# Patient Record
Sex: MALE | Race: WHITE | NOT HISPANIC OR LATINO | Employment: FULL TIME | URBAN - METROPOLITAN AREA
[De-identification: names, ages, dates, MRNs, and addresses within clinical notes are randomized per-mention and may not be internally consistent; named-entity substitution may affect disease eponyms.]

---

## 2023-04-05 ENCOUNTER — TELEPHONE (OUTPATIENT)
Dept: PRIMARY CARE | Facility: CLINIC | Age: 63
End: 2023-04-05

## 2023-04-05 NOTE — TELEPHONE ENCOUNTER
SLEEP STUDY AND DR NOTES NEED TO BE SENT TO [facility] [phone] IS PHONE NUMBER TO [place] OFFICE  [phone] IS THE FAX NUMBER  IN ORDER TO GET CPAP MACHINE

## 2023-07-07 ENCOUNTER — OFFICE VISIT (OUTPATIENT)
Dept: PRIMARY CARE | Facility: CLINIC | Age: 63
End: 2023-07-07
Payer: MEDICAID

## 2023-07-07 VITALS
WEIGHT: 247.2 LBS | HEIGHT: 70 IN | DIASTOLIC BLOOD PRESSURE: 66 MMHG | HEART RATE: 74 BPM | OXYGEN SATURATION: 98 % | SYSTOLIC BLOOD PRESSURE: 132 MMHG | BODY MASS INDEX: 35.39 KG/M2

## 2023-07-07 DIAGNOSIS — D58.2 ELEVATED HEMOGLOBIN (CMS-HCC): ICD-10-CM

## 2023-07-07 DIAGNOSIS — I10 PRIMARY HYPERTENSION: ICD-10-CM

## 2023-07-07 DIAGNOSIS — G47.33 OBSTRUCTIVE SLEEP APNEA, ADULT: Primary | ICD-10-CM

## 2023-07-07 DIAGNOSIS — D64.9 ANEMIA, UNSPECIFIED TYPE: ICD-10-CM

## 2023-07-07 DIAGNOSIS — F17.219 CIGARETTE NICOTINE DEPENDENCE WITH NICOTINE-INDUCED DISORDER: ICD-10-CM

## 2023-07-07 DIAGNOSIS — J41.8 MIXED SIMPLE AND MUCOPURULENT CHRONIC BRONCHITIS (MULTI): ICD-10-CM

## 2023-07-07 DIAGNOSIS — E78.2 MIXED HYPERLIPIDEMIA: ICD-10-CM

## 2023-07-07 DIAGNOSIS — D17.20 LIPOMA OF UPPER EXTREMITY, UNSPECIFIED LATERALITY: ICD-10-CM

## 2023-07-07 DIAGNOSIS — D75.1 POLYCYTHEMIA: ICD-10-CM

## 2023-07-07 PROBLEM — D17.9 LIPOMA: Status: ACTIVE | Noted: 2023-07-07

## 2023-07-07 PROBLEM — R73.9 HYPERGLYCEMIA: Status: ACTIVE | Noted: 2023-07-07

## 2023-07-07 PROBLEM — E78.5 HYPERLIPIDEMIA: Status: ACTIVE | Noted: 2023-07-07

## 2023-07-07 LAB — POC HEMOGLOBIN: 18.6 G/DL (ref 13.5–17.5)

## 2023-07-07 PROCEDURE — 99214 OFFICE O/P EST MOD 30 MIN: CPT | Performed by: FAMILY MEDICINE

## 2023-07-07 PROCEDURE — 85018 HEMOGLOBIN: CPT | Performed by: FAMILY MEDICINE

## 2023-07-07 RX ORDER — ATORVASTATIN CALCIUM 80 MG/1
TABLET, FILM COATED ORAL
COMMUNITY
Start: 2020-09-23 | End: 2023-07-07 | Stop reason: ALTCHOICE

## 2023-07-07 RX ORDER — ROSUVASTATIN CALCIUM 20 MG/1
20 TABLET, COATED ORAL DAILY
COMMUNITY
Start: 2023-04-22 | End: 2024-01-15 | Stop reason: SDUPTHER

## 2023-07-07 RX ORDER — LISINOPRIL 20 MG/1
20 TABLET ORAL DAILY
COMMUNITY
End: 2024-03-19 | Stop reason: DRUGHIGH

## 2023-07-07 RX ORDER — ASPIRIN 81 MG/1
81 TABLET ORAL DAILY
COMMUNITY
Start: 2020-09-23

## 2023-07-07 ASSESSMENT — ENCOUNTER SYMPTOMS
MYALGIAS: 0
DYSPHORIC MOOD: 0
SINUS PAIN: 0
SPEECH DIFFICULTY: 0
SEIZURES: 0
ABDOMINAL PAIN: 0
SLEEP DISTURBANCE: 0
NERVOUS/ANXIOUS: 0
CONSTIPATION: 0
DYSURIA: 0
ACTIVITY CHANGE: 0
HEADACHES: 0
BLOOD IN STOOL: 0
DECREASED CONCENTRATION: 0
DIARRHEA: 0
CONFUSION: 0
RECTAL PAIN: 0
EYE PAIN: 0
POLYDIPSIA: 0
DIZZINESS: 0
ADENOPATHY: 0
POLYPHAGIA: 0
HEMATURIA: 0
AGITATION: 0
STRIDOR: 0
CONSTITUTIONAL NEGATIVE: 1
PALPITATIONS: 0
FATIGUE: 0
NECK STIFFNESS: 0
SORE THROAT: 0
PHOTOPHOBIA: 0
SINUS PRESSURE: 0
ABDOMINAL DISTENTION: 0
COUGH: 0
FLANK PAIN: 0
COLOR CHANGE: 0
ARTHRALGIAS: 0
TROUBLE SWALLOWING: 0
SHORTNESS OF BREATH: 0
APPETITE CHANGE: 0
CHEST TIGHTNESS: 0
RHINORRHEA: 0
FEVER: 0

## 2023-07-07 NOTE — PATIENT INSTRUCTIONS
Follow up in 4 MONTHS    Continue current medications and therapy for chronic medical conditions.    Patient was advised importance of proper diet/nutrition in addition adequate hydration. Patient was encouraged moderate exercise program to include 30 minutes daily for 5 days of the week or 150 minutes weekly. Patient will follow-up with us as scheduled.    CPAP ordered     CBC, CMP and Lipid ordered     Dermatology referral ordered     IO Hemoglobin 18.6    CT Low dose ordered for December 2023    REFER TO HEMATOLOGY

## 2023-07-07 NOTE — PROGRESS NOTES
"Subjective   Patient ID: Rito Palomo is a 62 y.o. male who presents for Follow-up (Hyperlipidemia and hypertension. ).    Patient is here for a follow up on his hypertension, states he does not check his blood pressure at home but denies headaches,s blurred vision, heart palpitations.     Patient would like to discuss sleep study.          Review of Systems   Constitutional: Negative.  Negative for activity change, appetite change, fatigue and fever.   HENT:  Negative for congestion, dental problem, ear discharge, ear pain, mouth sores, rhinorrhea, sinus pressure, sinus pain, sore throat, tinnitus and trouble swallowing.    Eyes:  Negative for photophobia, pain and visual disturbance.   Respiratory:  Negative for cough, chest tightness, shortness of breath and stridor.    Cardiovascular:  Negative for chest pain and palpitations.   Gastrointestinal:  Negative for abdominal distention, abdominal pain, blood in stool, constipation, diarrhea and rectal pain.   Endocrine: Negative for cold intolerance, heat intolerance, polydipsia, polyphagia and polyuria.   Genitourinary:  Negative for dysuria, flank pain, hematuria and urgency.   Musculoskeletal:  Negative for arthralgias, gait problem, myalgias and neck stiffness.   Skin:  Negative for color change and rash.   Allergic/Immunologic: Negative for environmental allergies and food allergies.   Neurological:  Negative for dizziness, seizures, syncope, speech difficulty and headaches.   Hematological:  Negative for adenopathy.   Psychiatric/Behavioral:  Negative for agitation, confusion, decreased concentration, dysphoric mood and sleep disturbance. The patient is not nervous/anxious.        Objective   /66   Pulse 74   Ht 1.778 m (5' 10\")   Wt 112 kg (247 lb 3.2 oz)   SpO2 98%   BMI 35.47 kg/m²     Physical Exam  Vitals reviewed.   Constitutional:       General: He is not in acute distress.     Appearance: He is obese. He is not ill-appearing or " diaphoretic.   HENT:      Head: Normocephalic.      Right Ear: Tympanic membrane and external ear normal.      Left Ear: Tympanic membrane and external ear normal.      Nose: Nose normal. No congestion.      Mouth/Throat:      Mouth: Mucous membranes are dry.      Pharynx: No posterior oropharyngeal erythema.   Eyes:      General:         Right eye: No discharge.         Left eye: No discharge.      Extraocular Movements: Extraocular movements intact.      Conjunctiva/sclera: Conjunctivae normal.      Pupils: Pupils are equal, round, and reactive to light.   Cardiovascular:      Rate and Rhythm: Normal rate and regular rhythm.      Pulses: Normal pulses.      Heart sounds: Normal heart sounds. No murmur heard.  Pulmonary:      Effort: Pulmonary effort is normal. No respiratory distress.      Breath sounds: Normal breath sounds. No wheezing or rales.   Chest:      Chest wall: No tenderness.   Abdominal:      General: Abdomen is flat. Bowel sounds are normal. There is distension.      Palpations: There is no mass.      Tenderness: There is no abdominal tenderness. There is no guarding.   Genitourinary:     Rectum: Normal.   Musculoskeletal:         General: No tenderness. Normal range of motion.      Cervical back: Normal range of motion and neck supple. No tenderness.      Right lower leg: No edema.      Left lower leg: No edema.   Skin:     General: Skin is warm and dry.      Coloration: Skin is not jaundiced.      Findings: No bruising or erythema.   Neurological:      General: No focal deficit present.      Mental Status: He is alert and oriented to person, place, and time. Mental status is at baseline.      Cranial Nerves: No cranial nerve deficit.      Sensory: No sensory deficit.      Coordination: Coordination normal.      Gait: Gait normal.   Psychiatric:         Mood and Affect: Mood normal.         Thought Content: Thought content normal.         Judgment: Judgment normal.         Assessment/Plan   Problem  List Items Addressed This Visit       Hyperlipidemia    Relevant Orders    Lipid Panel    Hypertension    Relevant Orders    Comprehensive Metabolic Panel    CBC and Auto Differential    Lipoma    Relevant Orders    Referral to Dermatology    Obstructive sleep apnea, adult - Primary    Relevant Orders    Positive Airway Pressure (PAP) Therapy     Other Visit Diagnoses       Anemia, unspecified type        Relevant Orders    POCT Hemoglobin manually resulted (Completed)    Polycythemia        Relevant Orders    Referral to Hematology    Mixed simple and mucopurulent chronic bronchitis (CMS/HCC)               Scribe Attestation  By signing my name below, ILulu , Scribe   attest that this documentation has been prepared under the direction and in the presence of Topher Lou DO.  Provider Attestation - Scribe documentation  All medical record entries made by the Scribe were at my direction and personally dictated by me. I have reviewed the chart and agree that the record accurately reflects my personal performance of the history, physical exam, discussion and plan.

## 2023-11-07 ENCOUNTER — OFFICE VISIT (OUTPATIENT)
Dept: PRIMARY CARE | Facility: CLINIC | Age: 63
End: 2023-11-07
Payer: MEDICAID

## 2023-11-07 VITALS
HEIGHT: 70 IN | SYSTOLIC BLOOD PRESSURE: 128 MMHG | TEMPERATURE: 98.2 F | BODY MASS INDEX: 34.67 KG/M2 | DIASTOLIC BLOOD PRESSURE: 60 MMHG | OXYGEN SATURATION: 94 % | WEIGHT: 242.2 LBS | HEART RATE: 50 BPM | RESPIRATION RATE: 15 BRPM

## 2023-11-07 DIAGNOSIS — I10 PRIMARY HYPERTENSION: Primary | ICD-10-CM

## 2023-11-07 DIAGNOSIS — J41.8 MIXED SIMPLE AND MUCOPURULENT CHRONIC BRONCHITIS (MULTI): ICD-10-CM

## 2023-11-07 DIAGNOSIS — F17.200 NICOTINE DEPENDENCE WITH CURRENT USE: ICD-10-CM

## 2023-11-07 DIAGNOSIS — G47.33 OBSTRUCTIVE SLEEP APNEA, ADULT: ICD-10-CM

## 2023-11-07 DIAGNOSIS — E78.2 MIXED HYPERLIPIDEMIA: ICD-10-CM

## 2023-11-07 DIAGNOSIS — R00.1 BRADYCARDIA, SINUS: ICD-10-CM

## 2023-11-07 PROCEDURE — 99214 OFFICE O/P EST MOD 30 MIN: CPT | Performed by: FAMILY MEDICINE

## 2023-11-07 ASSESSMENT — ENCOUNTER SYMPTOMS
ARTHRALGIAS: 0
BLOOD IN STOOL: 0
POLYDIPSIA: 0
ACTIVITY CHANGE: 0
HYPERTENSION: 1
FATIGUE: 0
SEIZURES: 0
CONSTIPATION: 0
ABDOMINAL DISTENTION: 0
PHOTOPHOBIA: 0
COLOR CHANGE: 0
HEMATURIA: 0
SPEECH DIFFICULTY: 0
APPETITE CHANGE: 0
PALPITATIONS: 0
STRIDOR: 0
SHORTNESS OF BREATH: 0
ABDOMINAL PAIN: 0
EYE PAIN: 0
SINUS PAIN: 0
AGITATION: 0
CHEST TIGHTNESS: 0
FLANK PAIN: 0
NERVOUS/ANXIOUS: 0
HEADACHES: 0
DIZZINESS: 0
DIARRHEA: 0
CONSTITUTIONAL NEGATIVE: 1
NECK STIFFNESS: 0
DYSPHORIC MOOD: 0
TROUBLE SWALLOWING: 0
ADENOPATHY: 0
RHINORRHEA: 0
CONFUSION: 0
RECTAL PAIN: 0
SINUS PRESSURE: 0
COUGH: 0
POLYPHAGIA: 0
SORE THROAT: 0
SLEEP DISTURBANCE: 0
FEVER: 0
MYALGIAS: 0
DYSURIA: 0
DECREASED CONCENTRATION: 0

## 2023-11-07 NOTE — PROGRESS NOTES
Subjective   Patient ID: Rito Palomo is a 63 y.o. male who presents for Hyperlipidemia and Hypertension.    Patient is here for 3 months follow up on hypertension and hyperlipidemia.    Patient had done blood work on 08/23/2023.    Patient denies have any symptoms or concerns today.    Hyperlipidemia  This is a recurrent problem. Pertinent negatives include no chest pain, myalgias or shortness of breath.   Hypertension  This is a recurrent problem. The current episode started more than 1 year ago. The problem is unchanged. Pertinent negatives include no chest pain, headaches, palpitations or shortness of breath. There are no associated agents to hypertension. There are no known risk factors for coronary artery disease.        Review of Systems   Constitutional: Negative.  Negative for activity change, appetite change, fatigue and fever.   HENT:  Negative for congestion, dental problem, ear discharge, ear pain, mouth sores, rhinorrhea, sinus pressure, sinus pain, sore throat, tinnitus and trouble swallowing.    Eyes:  Negative for photophobia, pain and visual disturbance.   Respiratory:  Negative for cough, chest tightness, shortness of breath and stridor.    Cardiovascular:  Negative for chest pain and palpitations.   Gastrointestinal:  Negative for abdominal distention, abdominal pain, blood in stool, constipation, diarrhea and rectal pain.   Endocrine: Negative for cold intolerance, heat intolerance, polydipsia, polyphagia and polyuria.   Genitourinary:  Negative for dysuria, flank pain, hematuria and urgency.   Musculoskeletal:  Negative for arthralgias, gait problem, myalgias and neck stiffness.   Skin:  Negative for color change and rash.   Allergic/Immunologic: Negative for environmental allergies and food allergies.   Neurological:  Negative for dizziness, seizures, syncope, speech difficulty and headaches.   Hematological:  Negative for adenopathy.   Psychiatric/Behavioral:  Negative for agitation,  "confusion, decreased concentration, dysphoric mood and sleep disturbance. The patient is not nervous/anxious.        Objective   /60 (BP Location: Right arm, Patient Position: Sitting, BP Cuff Size: Child)   Pulse 50   Temp 36.8 °C (98.2 °F)   Resp 15   Ht 1.778 m (5' 10\")   Wt 110 kg (242 lb 3.2 oz)   SpO2 94%   BMI 34.75 kg/m²     Physical Exam  Vitals reviewed.   Constitutional:       General: He is not in acute distress.     Appearance: He is obese. He is not ill-appearing or diaphoretic.   HENT:      Head: Normocephalic.      Right Ear: Tympanic membrane and external ear normal.      Left Ear: Tympanic membrane and external ear normal.      Nose: Nose normal. No congestion.      Mouth/Throat:      Pharynx: No posterior oropharyngeal erythema.   Eyes:      General:         Right eye: No discharge.         Left eye: No discharge.      Extraocular Movements: Extraocular movements intact.      Conjunctiva/sclera: Conjunctivae normal.      Pupils: Pupils are equal, round, and reactive to light.   Cardiovascular:      Rate and Rhythm: Normal rate and regular rhythm.      Pulses: Normal pulses.      Heart sounds: Normal heart sounds. No murmur heard.  Pulmonary:      Effort: Pulmonary effort is normal. No respiratory distress.      Breath sounds: Normal breath sounds. No wheezing or rales.   Chest:      Chest wall: No tenderness.   Abdominal:      General: Bowel sounds are normal. There is distension.      Palpations: There is no mass.      Tenderness: There is no abdominal tenderness. There is no guarding.   Musculoskeletal:         General: No tenderness. Normal range of motion.      Cervical back: Normal range of motion and neck supple. No tenderness.      Right lower leg: No edema.      Left lower leg: No edema.   Skin:     General: Skin is dry.      Coloration: Skin is not jaundiced.      Findings: No bruising, erythema or rash.   Neurological:      General: No focal deficit present.      Mental " Status: He is alert and oriented to person, place, and time. Mental status is at baseline.      Cranial Nerves: No cranial nerve deficit.      Sensory: No sensory deficit.      Coordination: Coordination normal.      Gait: Gait normal.   Psychiatric:         Mood and Affect: Mood normal.         Thought Content: Thought content normal.         Judgment: Judgment normal.         Assessment/Plan   Problem List Items Addressed This Visit             ICD-10-CM    Hyperlipidemia E78.5    Hypertension - Primary I10    Obstructive sleep apnea, adult G47.33    Mixed simple and mucopurulent chronic bronchitis (CMS/[facility]) J41.8     STABLE          Other Visit Diagnoses         Codes    Nicotine dependence with current use     F17.200    Relevant Orders    CT lung screening low dose    Bradycardia, sinus     R00.1    Relevant Orders    Holter or Event Cardiac Monitor              Scribe Attestation  By signing my name below, ITrinidad RMA , Elias   attest that this documentation has been prepared under the direction and in the presence of Topher Lou DO.   Provider Attestation - Scribe documentation    All medical record entries made by the Scribe were at my direction and personally dictated by me. I have reviewed the chart and agree that the record accurately reflects my personal performance of the history, physical exam, discussion and plan.

## 2023-11-07 NOTE — PATIENT INSTRUCTIONS
Follow up 5 days post CT cardiac score with regular follow-up visit in 3 months    Continue current medications and therapy for chronic medical conditions.    Patient was advised importance of proper diet/nutrition in addition adequate hydration. Patient was encouraged moderate exercise program to include 30 minutes daily for 5 days of the week or 150 minutes weekly. Patient will follow-up with us as scheduled.    RECOMMEND PREVNAR 20     OBTAIN CT LOW DOSE SCREENING

## 2023-11-10 ENCOUNTER — HOSPITAL ENCOUNTER (OUTPATIENT)
Dept: CARDIOLOGY | Facility: HOSPITAL | Age: 63
Discharge: HOME | End: 2023-11-10
Payer: MEDICAID

## 2023-11-10 DIAGNOSIS — R00.1 BRADYCARDIA, SINUS: ICD-10-CM

## 2023-11-10 PROCEDURE — 93225 XTRNL ECG REC<48 HRS REC: CPT

## 2023-11-10 PROCEDURE — 93227 XTRNL ECG REC<48 HR R&I: CPT | Performed by: INTERNAL MEDICINE

## 2023-12-22 ENCOUNTER — ANCILLARY PROCEDURE (OUTPATIENT)
Dept: RADIOLOGY | Facility: CLINIC | Age: 63
End: 2023-12-22
Payer: MEDICAID

## 2023-12-22 DIAGNOSIS — F17.219 NICOTINE DEPENDENCE, CIGARETTES, WITH UNSPECIFIED NICOTINE-INDUCED DISORDERS: ICD-10-CM

## 2023-12-22 PROCEDURE — 71271 CT THORAX LUNG CANCER SCR C-: CPT | Performed by: RADIOLOGY

## 2023-12-22 PROCEDURE — 71271 CT THORAX LUNG CANCER SCR C-: CPT

## 2024-01-02 ENCOUNTER — APPOINTMENT (OUTPATIENT)
Dept: RADIOLOGY | Facility: CLINIC | Age: 64
End: 2024-01-02
Payer: MEDICAID

## 2024-01-05 ENCOUNTER — OFFICE VISIT (OUTPATIENT)
Dept: PRIMARY CARE | Facility: CLINIC | Age: 64
End: 2024-01-05
Payer: MEDICAID

## 2024-01-05 VITALS
OXYGEN SATURATION: 94 % | RESPIRATION RATE: 16 BRPM | DIASTOLIC BLOOD PRESSURE: 78 MMHG | HEART RATE: 52 BPM | HEIGHT: 70 IN | SYSTOLIC BLOOD PRESSURE: 134 MMHG | WEIGHT: 237.4 LBS | BODY MASS INDEX: 33.99 KG/M2 | TEMPERATURE: 98.4 F

## 2024-01-05 DIAGNOSIS — R49.9 HOARSENESS OR CHANGING VOICE: ICD-10-CM

## 2024-01-05 DIAGNOSIS — G47.33 OBSTRUCTIVE SLEEP APNEA, ADULT: ICD-10-CM

## 2024-01-05 DIAGNOSIS — I10 PRIMARY HYPERTENSION: ICD-10-CM

## 2024-01-05 DIAGNOSIS — E78.2 MIXED HYPERLIPIDEMIA: ICD-10-CM

## 2024-01-05 DIAGNOSIS — J41.8 MIXED SIMPLE AND MUCOPURULENT CHRONIC BRONCHITIS (MULTI): Primary | ICD-10-CM

## 2024-01-05 PROCEDURE — 99214 OFFICE O/P EST MOD 30 MIN: CPT | Performed by: FAMILY MEDICINE

## 2024-01-05 ASSESSMENT — ENCOUNTER SYMPTOMS
ARTHRALGIAS: 0
COLOR CHANGE: 0
ABDOMINAL DISTENTION: 0
CONFUSION: 0
POLYDIPSIA: 0
DIARRHEA: 0
NECK STIFFNESS: 0
DIZZINESS: 0
NERVOUS/ANXIOUS: 0
SINUS PAIN: 0
DYSURIA: 0
EYE PAIN: 0
APPETITE CHANGE: 0
FATIGUE: 0
MYALGIAS: 0
FLANK PAIN: 0
FEVER: 0
POLYPHAGIA: 0
TROUBLE SWALLOWING: 0
SINUS PRESSURE: 0
HEMATURIA: 0
ACTIVITY CHANGE: 0
DYSPHORIC MOOD: 0
SPEECH DIFFICULTY: 0
PALPITATIONS: 0
CONSTITUTIONAL NEGATIVE: 1
AGITATION: 0
PHOTOPHOBIA: 0
CHEST TIGHTNESS: 0
ADENOPATHY: 0
SEIZURES: 0
SORE THROAT: 0
DECREASED CONCENTRATION: 0
SHORTNESS OF BREATH: 0
RECTAL PAIN: 0
SLEEP DISTURBANCE: 0
BLOOD IN STOOL: 0
HEADACHES: 0
ABDOMINAL PAIN: 0
STRIDOR: 0
COUGH: 0
RHINORRHEA: 0
CONSTIPATION: 0

## 2024-01-05 NOTE — PROGRESS NOTES
"Subjective   Patient ID: Rito Palomo is a 63 y.o. male who presents for Hypertension and Results.    Patient is here for follow up on hypertension.    Patient would like discuss his results.    Patient had done a CT lung screening on 12/22/2023  Patient had done a Holter monitor on 11/10/2023    Patient denies any other symptoms or concerns today.         Review of Systems   Constitutional: Negative.  Negative for activity change, appetite change, fatigue and fever.   HENT:  Negative for congestion, dental problem, ear discharge, ear pain, mouth sores, rhinorrhea, sinus pressure, sinus pain, sore throat, tinnitus and trouble swallowing.    Eyes:  Negative for photophobia, pain and visual disturbance.   Respiratory:  Negative for cough, chest tightness, shortness of breath and stridor.    Cardiovascular:  Negative for chest pain and palpitations.   Gastrointestinal:  Negative for abdominal distention, abdominal pain, blood in stool, constipation, diarrhea and rectal pain.   Endocrine: Negative for cold intolerance, heat intolerance, polydipsia, polyphagia and polyuria.   Genitourinary:  Negative for dysuria, flank pain, hematuria and urgency.   Musculoskeletal:  Negative for arthralgias, gait problem, myalgias and neck stiffness.   Skin:  Negative for color change and rash.   Allergic/Immunologic: Negative for environmental allergies and food allergies.   Neurological:  Negative for dizziness, seizures, syncope, speech difficulty and headaches.   Hematological:  Negative for adenopathy.   Psychiatric/Behavioral:  Negative for agitation, confusion, decreased concentration, dysphoric mood and sleep disturbance. The patient is not nervous/anxious.        Objective   /78 (BP Location: Left arm, Patient Position: Sitting, BP Cuff Size: Adult)   Pulse 52   Temp 36.9 °C (98.4 °F)   Resp 16   Ht 1.778 m (5' 10\")   Wt 108 kg (237 lb 6.4 oz)   SpO2 94%   BMI 34.06 kg/m²     Physical Exam  Vitals reviewed. "   Constitutional:       General: He is not in acute distress.     Appearance: He is obese. He is not ill-appearing or diaphoretic.   HENT:      Head: Normocephalic.      Right Ear: Tympanic membrane and external ear normal.      Left Ear: Tympanic membrane and external ear normal.      Nose: Nose normal. No congestion.      Mouth/Throat:      Pharynx: No posterior oropharyngeal erythema.      Comments: Hoarseness  Eyes:      General:         Right eye: No discharge.         Left eye: No discharge.      Extraocular Movements: Extraocular movements intact.      Conjunctiva/sclera: Conjunctivae normal.      Pupils: Pupils are equal, round, and reactive to light.   Cardiovascular:      Rate and Rhythm: Normal rate and regular rhythm.      Pulses: Normal pulses.      Heart sounds: Normal heart sounds. No murmur heard.  Pulmonary:      Effort: Pulmonary effort is normal. No respiratory distress.      Breath sounds: Normal breath sounds. No wheezing or rales.   Chest:      Chest wall: No tenderness.   Abdominal:      General: Bowel sounds are normal. There is distension.      Palpations: There is no mass.      Tenderness: There is no abdominal tenderness. There is no guarding.   Musculoskeletal:         General: No tenderness. Normal range of motion.      Cervical back: Normal range of motion and neck supple. No tenderness.      Right lower leg: No edema.      Left lower leg: No edema.   Skin:     General: Skin is dry.      Coloration: Skin is not jaundiced.      Findings: No bruising, erythema or rash.   Neurological:      General: No focal deficit present.      Mental Status: He is alert and oriented to person, place, and time. Mental status is at baseline.      Cranial Nerves: No cranial nerve deficit.      Sensory: No sensory deficit.      Coordination: Coordination normal.      Gait: Gait normal.   Psychiatric:         Mood and Affect: Mood normal.         Thought Content: Thought content normal.         Judgment:  Judgment normal.         Assessment/Plan   Problem List Items Addressed This Visit             ICD-10-CM    Hyperlipidemia E78.5    Hypertension I10    Obstructive sleep apnea, adult G47.33    Mixed simple and mucopurulent chronic bronchitis (CMS/HCC) - Primary J41.8    Relevant Orders    Referral to [facility]     Other Visit Diagnoses         Codes    Hoarseness or changing voice     R49.9    Relevant Orders    Referral to ENT          Scribe Attestation  By signing my name below, I, Topher Lou DO , Scribkatherine   attest that this documentation has been prepared under the direction and in the presence of Topher Lou DO.     Provider Attestation - Scribe documentation    All medical record entries made by the Scribe were at my direction and personally dictated by me. I have reviewed the chart and agree that the record accurately reflects my personal performance of the history, physical exam, discussion and plan.

## 2024-01-05 NOTE — PATIENT INSTRUCTIONS
Follow up in 4 months    Continue current medications and therapy for chronic medical conditions.    Patient was advised importance of proper diet/nutrition in addition adequate hydration. Patient was encouraged moderate exercise program to include 30 minutes daily for 5 days of the week or 150 minutes weekly. Patient will follow-up with us as scheduled.    Review CT scan of the chest from December 2023    Referred for ENT/hoarseness    Referred to [facility]

## 2024-01-15 DIAGNOSIS — E78.2 MIXED HYPERLIPIDEMIA: ICD-10-CM

## 2024-01-15 NOTE — TELEPHONE ENCOUNTER
Rec'v a fax from [facility] in [place] requesting 90 day refill on Rosuvastatin 20mg every day.    Patient is due to see Dr. Friedman for his 1 year visit in Feb. 2024.  Patient's appointment was bumped d/t doctor being out of town.  We will continue to try and reach the patient to reschedule OV.    To Dr. Friedman for approval.  ---ssd.

## 2024-01-16 RX ORDER — ROSUVASTATIN CALCIUM 20 MG/1
20 TABLET, COATED ORAL DAILY
Qty: 90 TABLET | Refills: 3 | Status: SHIPPED | OUTPATIENT
Start: 2024-01-16

## 2024-01-19 ENCOUNTER — ALLIED HEALTH (OUTPATIENT)
Dept: INTEGRATIVE MEDICINE | Facility: CLINIC | Age: 64
End: 2024-01-19

## 2024-01-19 DIAGNOSIS — J41.8 MIXED SIMPLE AND MUCOPURULENT CHRONIC BRONCHITIS (MULTI): ICD-10-CM

## 2024-01-19 DIAGNOSIS — F17.200 TOBACCO USE DISORDER: Primary | ICD-10-CM

## 2024-01-19 PROCEDURE — 97139 UNLISTED THERAPEUTIC PX: CPT

## 2024-01-26 ENCOUNTER — ALLIED HEALTH (OUTPATIENT)
Dept: INTEGRATIVE MEDICINE | Facility: CLINIC | Age: 64
End: 2024-01-26

## 2024-01-26 DIAGNOSIS — F17.200 TOBACCO USE DISORDER: Primary | ICD-10-CM

## 2024-01-26 DIAGNOSIS — J41.8 MIXED SIMPLE AND MUCOPURULENT CHRONIC BRONCHITIS (MULTI): ICD-10-CM

## 2024-01-26 PROCEDURE — 97139 UNLISTED THERAPEUTIC PX: CPT

## 2024-01-26 NOTE — PROGRESS NOTES
"Acupuncture Visit:     Subjective   Patient ID: Rito Palomo is a 63 y.o. male who presents for Nicotine Dependence and Cough    Update: 01/26/24    Hasn't had the urge to smoke as he normally did before last visit.  He notes he has been exercising more patience and mindfulness with his habits overall.    Initial Visit: 1/19/24    MC: Smoking cessation    Patient was referred by his PCP.  He verbally expressed interest in quitting smoking and states he wants to quit for economic reasons.  Has tried quitting in the past but \"not hard enough.\"  Currently smokes under a pack a day.  Works as a .        Session Information  Is this acupuncture treatment being billed to the patient's insurance company: No  Visit Type: Follow-up visit  Medical History Reviewed: I have reviewed pertinent medical history in EHR, and no contraindications are present to provide treatment         Review of Systems         Provider reviewed plan for the acupuncture session, precautions and contraindications. Patient/guardian/hospital staff has given consent to treat with full understanding of what to expect during the session. Before acupuncture began, provider explained to the patient to communicate at any time if the procedure was causing discomfort past their tolerance level. Patient agreed to advise acupuncturist. The acupuncturist counseled the patient on the risks of acupuncture treatment including pain, infection, bleeding, and no relief of pain. The patient was positioned comfortably. There was no evidence of infection at the site of needle insertions.    Objective   Physical Exam         Treatment Plan  Treatment Goals: Wellbeing improvement, Relaxation, Stress reduction    Acupuncture Treatment  Patient Position: Seated and reclining  Acupuncture Needling: Yes  Needle Guage: 40 guage /.16/ Red seirin, 32 guage /.25/ Purple seirin  Body Points: With retention  Body Points - Bilateral: 4 ritchie; ST36,40; LU9; SP9; Adalberto " Sharifa  Auricular Points: Yes  Auricular Points - Bilateral: NADA Protocol  Electroacupuncture Used: No  Needle Count In: 24  Needle Count Out: 24  Needle Retention Time (min): 30 minutes              Assessment/Plan

## 2024-02-09 ENCOUNTER — OFFICE VISIT (OUTPATIENT)
Dept: PRIMARY CARE | Facility: CLINIC | Age: 64
End: 2024-02-09
Payer: MEDICAID

## 2024-02-09 ENCOUNTER — APPOINTMENT (OUTPATIENT)
Dept: INTEGRATIVE MEDICINE | Facility: CLINIC | Age: 64
End: 2024-02-09

## 2024-02-09 ENCOUNTER — TELEPHONE (OUTPATIENT)
Dept: PRIMARY CARE | Facility: CLINIC | Age: 64
End: 2024-02-09

## 2024-02-09 VITALS
BODY MASS INDEX: 24.56 KG/M2 | HEART RATE: 50 BPM | WEIGHT: 175.4 LBS | SYSTOLIC BLOOD PRESSURE: 154 MMHG | OXYGEN SATURATION: 95 % | TEMPERATURE: 97.5 F | HEIGHT: 71 IN | DIASTOLIC BLOOD PRESSURE: 79 MMHG | RESPIRATION RATE: 16 BRPM

## 2024-02-09 DIAGNOSIS — R06.09 DYSPNEA ON EXERTION: ICD-10-CM

## 2024-02-09 DIAGNOSIS — J40 BRONCHITIS: Primary | ICD-10-CM

## 2024-02-09 DIAGNOSIS — J01.00 ACUTE NON-RECURRENT MAXILLARY SINUSITIS: Primary | ICD-10-CM

## 2024-02-09 DIAGNOSIS — R05.3 PERSISTENT COUGH FOR 3 WEEKS OR LONGER: ICD-10-CM

## 2024-02-09 DIAGNOSIS — R05.8 PRODUCTIVE COUGH: ICD-10-CM

## 2024-02-09 DIAGNOSIS — R05.9 COUGH IN ADULT PATIENT: ICD-10-CM

## 2024-02-09 PROCEDURE — 99213 OFFICE O/P EST LOW 20 MIN: CPT | Performed by: NURSE PRACTITIONER

## 2024-02-09 RX ORDER — LEVOFLOXACIN 500 MG/1
500 TABLET, FILM COATED ORAL DAILY
Qty: 10 TABLET | Refills: 0 | Status: SHIPPED | OUTPATIENT
Start: 2024-02-09 | End: 2024-02-19

## 2024-02-09 RX ORDER — PREDNISONE 10 MG/1
TABLET ORAL
Qty: 30 TABLET | Refills: 0 | Status: SHIPPED | OUTPATIENT
Start: 2024-02-09 | End: 2024-02-19

## 2024-02-09 ASSESSMENT — ENCOUNTER SYMPTOMS
DEPRESSION: 0
LOSS OF SENSATION IN FEET: 0
OCCASIONAL FEELINGS OF UNSTEADINESS: 0

## 2024-02-09 ASSESSMENT — PATIENT HEALTH QUESTIONNAIRE - PHQ9
SUM OF ALL RESPONSES TO PHQ9 QUESTIONS 1 AND 2: 0
2. FEELING DOWN, DEPRESSED OR HOPELESS: NOT AT ALL
1. LITTLE INTEREST OR PLEASURE IN DOING THINGS: NOT AT ALL

## 2024-02-09 NOTE — TELEPHONE ENCOUNTER
[id number] [place] [facility] [address] [facility] [address]   PT STATES HE IS STILL HAVING SOME SINUS ISSUES AS DISCUSSED WITH CLOTILDE, BUT NOW HE IS COUGHING UP YELLOW PHLEGM. HE IS WONDERING IF SOMETHING CAN BE CALLED IN FOR HIM PLEASE.

## 2024-02-09 NOTE — PROGRESS NOTES
Subjective   Patient ID: Rito Palomo is a 63 y.o. male who presents for URI.        Symptoms:  cough with yellow sputum, sinus pressure, congestion, runny nose.  Length of symptoms: 3 weeks ago  OTC: mucinex with mild help.  Related information:   HPI     Review of Systems    Objective   There were no vitals taken for this visit.    Physical Exam    Assessment/Plan

## 2024-02-09 NOTE — PROGRESS NOTES
Subjective   Patient ID: Rito Palomo is a 63 y.o. male who is with chief complaint of persistent productive cough.     HPI  Patient is a 63 y.o. male who CONSULTED AT [facility] today. Patient is with complaints of nasal congestion, nasal discharge, headache / sinus pain, post nasal drip, productive cough, shortness of breath on exertion, and intermittent wheezing. He denies having any sore throat, fatigue, muscle ache, loss of sense of taste, loss of sense of smell, diarrhea, chills nor fever. 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 he denies chest pain, palpitations, nor edema. he stated that he  tried OTC medications which afforded only slight relief of symptoms. he denies nausea, vomiting, abdominal pain, nor any other symptoms.    Patient states he had his COVID vaccine.  Patient states he have not yet received flu shot for this season.    Review of Systems  General: no weight loss, generally healthy, no fatigue  Head: (+) headaches / sinus pain, no vertigo, no injury  Eyes: no diplopia, no tearing, no pain,   Ears: no change in hearing, no tinnitus, no bleeding, no vertigo  Mouth:  no dental difficulties, no gingival bleeding, no sore throat, no loss of sense of taste, (+) post nasal drip,   Nose: (+) congestion, (+) discharge, no bleeding, no obstruction, no loss of sense of smell  Neck: no stiffness, no pain, no tenderness, no masses, no bruit  Pulmonary: (+) dyspnea on exertion, (+) intermittent wheezing, no hemoptysis, (+) productive cough  Cardiovascular: no chest pain, no palpitations, no syncope, no orthopnea  Gastrointestinal: no change in appetite, no dysphagia, no abdominal pains, no diarrhea, no emesis, no melena  Genito Urinary: no dysuria, no urinary urgency, no nocturia, no incontinence, no change in nature of urine  Musculoskeletal: no muscle ache, no joint pain,  no limitation of range of motion, no paresthesia, no numbness  Constitutional: no fever, no chills, no night sweats    Objective   Physical Exam  General: ambulatory, in no acute distress  Head: normocephalic, no lesions, no sinus tenderness  Eyes: pink palpebral conjunctiva, anicteric sclerae, PERRLA, EOM's full  Ears: clear external auditory canals, no ear discharge, no bleeding from the ears, tympanic membrane intact  Nose: normal looking nasal mucosa, no nasal discharge, no bleeding, no obstruction  Throat: (+) erythema, and (+) exudate on posterior pharyngeal wall, no lesion  Neck: supple, no masses, no bruits, no CLADP  Chest: symmetrical chest expansion, no lagging, no retractions, (+) harsh breath sounds, (+) diffuse rhonchi on both lung fields, no rales, no wheezes  Extremities: full and equal peripheral pulses, no edema,     Assessment/Plan   Problem List Items Addressed This Visit    None  Visit Diagnoses         Codes    Bronchitis    -  Primary J40    Relevant Medications    predniSONE (Deltasone) 10 mg tablet    levoFLOXacin (Levaquin) 500 mg tablet    Cough in adult patient     R05.9    Relevant Medications    levoFLOXacin (Levaquin) 500 mg tablet    Dyspnea on exertion     R06.09    Relevant Medications    predniSONE (Deltasone) 10 mg tablet    levoFLOXacin (Levaquin) 500 mg tablet    Persistent cough for 3 weeks or longer     R05.3    Relevant Medications    levoFLOXacin (Levaquin) 500 mg tablet    Productive cough     R05.8    Relevant Medications    levoFLOXacin (Levaquin) 500 mg tablet        DISCHARGE SUMMARY:   Patient was seen and examined. Diagnosis, treatment, treatment options, and possible complications of today's illness discussed and explained to patient. Patient to take medication/s associated with this visit. Patient may take OTC decongestant of choice as needed. Patient may also take OTC analgesic/antipyretic if needed for pain/fever. Advised to increase oral fluid intake. Advised  steam inhalation if needed to relieve congestion. Advised warm saline gargle if needed to relieve throat discomfort. Advised to come back if with worsening or persistent symptoms. Patient verbalized understanding of plan of care.    Patient to come back in 7 - 10 days if needed for worsening symptoms.         GRAHAM Hayes-CNP 02/09/24 2:53 PM

## 2024-02-10 RX ORDER — CEFUROXIME AXETIL 500 MG/1
500 TABLET ORAL 2 TIMES DAILY
Qty: 20 TABLET | Refills: 0 | Status: SHIPPED | OUTPATIENT
Start: 2024-02-10 | End: 2024-02-20

## 2024-02-10 NOTE — PATIENT INSTRUCTIONS
DISCHARGE SUMMARY:   Patient was seen and examined. Diagnosis, treatment, treatment options, and possible complications of today's illness discussed and explained to patient. Patient to take medication/s associated with this visit. Patient may take OTC decongestant of choice as needed. Patient may also take OTC analgesic/antipyretic if needed for pain/fever. Advised to increase oral fluid intake. Advised steam inhalation if needed to relieve congestion. Advised warm saline gargle if needed to relieve throat discomfort. Advised to come back if with worsening or persistent symptoms. Patient verbalized understanding of plan of care.    Patient to come back in 7 - 10 days if needed for worsening symptoms.

## 2024-02-16 ENCOUNTER — ALLIED HEALTH (OUTPATIENT)
Dept: INTEGRATIVE MEDICINE | Facility: CLINIC | Age: 64
End: 2024-02-16

## 2024-02-16 DIAGNOSIS — F17.200 TOBACCO USE DISORDER: Primary | ICD-10-CM

## 2024-02-16 PROCEDURE — 97139 UNLISTED THERAPEUTIC PX: CPT

## 2024-02-16 NOTE — PROGRESS NOTES
"Acupuncture Visit:     Subjective   Patient ID: Rito Palomo is a 63 y.o. male who presents for Nicotine Dependence    Update: 02/16/24    Hasn't had a cigarette craving in recent weeks but notes he is still smoking.  Scheduled for a cardiology visit in mid March and nervous about still smoking when that time comes.    Update: 01/26/24    Hasn't had the urge to smoke as he normally did before last visit.  He notes he has been exercising more patience and mindfulness with his habits overall.    Initial Visit: 1/19/24    MC: Smoking cessation    Patient was referred by his PCP.  He verbally expressed interest in quitting smoking and states he wants to quit for economic reasons.  Has tried quitting in the past but \"not hard enough.\"  Currently smokes under a pack a day.  Works as a .        Session Information  Is this acupuncture treatment being billed to the patient's insurance company: No  Visit Type: Follow-up visit  Medical History Reviewed: I have reviewed pertinent medical history in EHR, and no contraindications are present to provide treatment         Review of Systems         Provider reviewed plan for the acupuncture session, precautions and contraindications. Patient/guardian/hospital staff has given consent to treat with full understanding of what to expect during the session. Before acupuncture began, provider explained to the patient to communicate at any time if the procedure was causing discomfort past their tolerance level. Patient agreed to advise acupuncturist. The acupuncturist counseled the patient on the risks of acupuncture treatment including pain, infection, bleeding, and no relief of pain. The patient was positioned comfortably. There was no evidence of infection at the site of needle insertions.    Objective   Physical Exam         Treatment Plan  Treatment Goals: Wellbeing improvement, Stress reduction, Relaxation    Acupuncture Treatment  Patient Position: Seated and " reclining  Acupuncture Needling: Yes  Needle Guage: 40 guage /.16/ Red seirin, 36 guage /.20/ Blue seirin  Body Points: With retention  Body Points - Bilateral: 4 ritchie; GB34,41; SP6; ST36  Auricular Points: Yes  Auricular Points - Bilateral: NADA Protocol  Electroacupuncture Used: No  Needle Count In: 22  Needle Count Out: 22  Needle Retention Time (min): 30 minutes  Total Face to Face Time (min): 25 minutes              Assessment/Plan

## 2024-02-23 ENCOUNTER — ALLIED HEALTH (OUTPATIENT)
Dept: INTEGRATIVE MEDICINE | Facility: CLINIC | Age: 64
End: 2024-02-23

## 2024-02-23 DIAGNOSIS — F17.200 TOBACCO USE DISORDER: Primary | ICD-10-CM

## 2024-02-23 PROCEDURE — 97139 UNLISTED THERAPEUTIC PX: CPT

## 2024-02-23 NOTE — PROGRESS NOTES
"Acupuncture Visit:     Subjective   Patient ID: Rito Palomo is a 63 y.o. male who presents for Nicotine Dependence    Update: 02/23/24    Patient reports he is slowly making the step to cut back on alcohol and coffee intake because he states they normally lead to him smoking.    Update: 02/16/24    Hasn't had a cigarette craving in recent weeks but notes he is still smoking.  Scheduled for a cardiology visit in mid March and nervous about still smoking when that time comes.    Update: 01/26/24    Hasn't had the urge to smoke as he normally did before last visit.  He notes he has been exercising more patience and mindfulness with his habits overall.    Initial Visit: 1/19/24    MC: Smoking cessation    Patient was referred by his PCP.  He verbally expressed interest in quitting smoking and states he wants to quit for economic reasons.  Has tried quitting in the past but \"not hard enough.\"  Currently smokes under a pack a day.  Works as a .        Session Information  Is this acupuncture treatment being billed to the patient's insurance company: No  Visit Type: Follow-up visit  Medical History Reviewed: I have reviewed pertinent medical history in EHR, and no contraindications are present to provide treatment         Review of Systems         Provider reviewed plan for the acupuncture session, precautions and contraindications. Patient/guardian/hospital staff has given consent to treat with full understanding of what to expect during the session. Before acupuncture began, provider explained to the patient to communicate at any time if the procedure was causing discomfort past their tolerance level. Patient agreed to advise acupuncturist. The acupuncturist counseled the patient on the risks of acupuncture treatment including pain, infection, bleeding, and no relief of pain. The patient was positioned comfortably. There was no evidence of infection at the site of needle insertions.    Objective "   Physical Exam         Treatment Plan  Treatment Goals: Wellbeing improvement, Stress reduction, Relaxation    Acupuncture Treatment  Patient Position: Supine on a table  Acupuncture Needling: Yes  Needle Guage: 36 guage /.20/ Blue seirin, 40 guage /.16/ Red seirin  Body Points: With retention  Body Points - Bilateral: 4 ritchie; GB34,41; SP6; ST36  Auricular Points: Yes  Auricular Points - Bilateral: NADA Protocol  Electroacupuncture Used: No  Needle Count In: 22  Needle Count Out: 22  Needle Retention Time (min): 30 minutes  Total Face to Face Time (min): 25 minutes              Assessment/Plan

## 2024-03-01 ENCOUNTER — ALLIED HEALTH (OUTPATIENT)
Dept: INTEGRATIVE MEDICINE | Facility: CLINIC | Age: 64
End: 2024-03-01

## 2024-03-01 DIAGNOSIS — F17.200 TOBACCO USE DISORDER: Primary | ICD-10-CM

## 2024-03-01 PROCEDURE — 97139 UNLISTED THERAPEUTIC PX: CPT

## 2024-03-01 NOTE — PROGRESS NOTES
"Acupuncture Visit:     Subjective   Patient ID: Rito Palomo is a 63 y.o. male who presents for Nicotine Dependence    Update: 03/01/24    Patient reports he continues to make progress although slowly.    Update: 02/23/24    Patient reports he is slowly making the step to cut back on alcohol and coffee intake because he states they normally lead to him smoking.    Update: 02/16/24    Hasn't had a cigarette craving in recent weeks but notes he is still smoking.  Scheduled for a cardiology visit in mid March and nervous about still smoking when that time comes.    Update: 01/26/24    Hasn't had the urge to smoke as he normally did before last visit.  He notes he has been exercising more patience and mindfulness with his habits overall.    Initial Visit: 1/19/24    MC: Smoking cessation    Patient was referred by his PCP.  He verbally expressed interest in quitting smoking and states he wants to quit for economic reasons.  Has tried quitting in the past but \"not hard enough.\"  Currently smokes under a pack a day.  Works as a .        Session Information  Is this acupuncture treatment being billed to the patient's insurance company: No  Visit Type: Follow-up visit  Medical History Reviewed: I have reviewed pertinent medical history in EHR, and no contraindications are present to provide treatment         Review of Systems         Provider reviewed plan for the acupuncture session, precautions and contraindications. Patient/guardian/hospital staff has given consent to treat with full understanding of what to expect during the session. Before acupuncture began, provider explained to the patient to communicate at any time if the procedure was causing discomfort past their tolerance level. Patient agreed to advise acupuncturist. The acupuncturist counseled the patient on the risks of acupuncture treatment including pain, infection, bleeding, and no relief of pain. The patient was positioned comfortably. " There was no evidence of infection at the site of needle insertions.    Objective   Physical Exam         Treatment Plan  Treatment Goals: Wellbeing improvement, Relaxation, Stress reduction    Acupuncture Treatment  Patient Position: Seated and reclining  Acupuncture Needling: Yes  Needle Guage: 32 guage /.25/ Purple seirin, 40 guage /.16/ Red seirin  Body Points: With retention  Body Points - Bilateral: 4 ritchie  Auricular Points: Yes  Auricular Points - Bilateral: NADA Protocol  Electroacupuncture Used: No  Needle Count In: 14  Needle Count Out: 14  Needle Retention Time (min): 30 minutes  Total Face to Face Time (min): 25 minutes              Assessment/Plan

## 2024-03-19 ENCOUNTER — OFFICE VISIT (OUTPATIENT)
Dept: CARDIOLOGY | Facility: CLINIC | Age: 64
End: 2024-03-19
Payer: MEDICAID

## 2024-03-19 VITALS
HEART RATE: 52 BPM | BODY MASS INDEX: 32.48 KG/M2 | HEIGHT: 71 IN | DIASTOLIC BLOOD PRESSURE: 48 MMHG | WEIGHT: 232 LBS | SYSTOLIC BLOOD PRESSURE: 82 MMHG

## 2024-03-19 DIAGNOSIS — F17.200 NICOTINE DEPENDENCE, UNCOMPLICATED, UNSPECIFIED NICOTINE PRODUCT TYPE: ICD-10-CM

## 2024-03-19 DIAGNOSIS — I25.10 CORONARY ARTERY DISEASE INVOLVING NATIVE CORONARY ARTERY OF NATIVE HEART WITHOUT ANGINA PECTORIS: Primary | ICD-10-CM

## 2024-03-19 DIAGNOSIS — I10 PRIMARY HYPERTENSION: ICD-10-CM

## 2024-03-19 DIAGNOSIS — E78.49 OTHER HYPERLIPIDEMIA: ICD-10-CM

## 2024-03-19 PROCEDURE — 3008F BODY MASS INDEX DOCD: CPT | Performed by: INTERNAL MEDICINE

## 2024-03-19 PROCEDURE — 4004F PT TOBACCO SCREEN RCVD TLK: CPT | Performed by: INTERNAL MEDICINE

## 2024-03-19 PROCEDURE — 99214 OFFICE O/P EST MOD 30 MIN: CPT | Performed by: INTERNAL MEDICINE

## 2024-03-19 PROCEDURE — 3078F DIAST BP <80 MM HG: CPT | Performed by: INTERNAL MEDICINE

## 2024-03-19 PROCEDURE — 3074F SYST BP LT 130 MM HG: CPT | Performed by: INTERNAL MEDICINE

## 2024-03-19 RX ORDER — LISINOPRIL 10 MG/1
10 TABLET ORAL DAILY
Qty: 90 TABLET | Refills: 3 | Status: SHIPPED | OUTPATIENT
Start: 2024-03-19 | End: 2025-03-19

## 2024-03-19 NOTE — PROGRESS NOTES
"Chief Complaint:   Please see below.     History Of Present Illness:    Rito Palomo is a 63 y.o. male presenting with CAD.    This 63-year-old hypertensive, hyperlipidemic smoker with obesity, sleep apnea, mild aortic stenosis, and coronary artery disease with prior bare-metal stent of the LAD in January 2014 returns to the office in routine follow-up. He has treated ROBERT, and is sleeping well.  The patient denies chest discomfort, dyspnea, palpitations, orthopnea, PND, syncope, and near syncope.    The patient is still smoking despite my warnings.       Last Recorded Vitals:  Vitals:    03/19/24 0900   BP: (!) 82/48   BP Location: Right arm   Patient Position: Sitting   Pulse: 52   Weight: 105 kg (232 lb)   Height: 1.803 m (5' 11\")   Body mass index is 32.36 kg/m².      Past Medical History:  He has a past medical history of Body mass index (BMI) 35.0-35.9, adult (02/26/2021), Body mass index (BMI) 36.0-36.9, adult (10/28/2020), Cellulitis of left lower limb (10/28/2020), Cellulitis of right lower limb (10/28/2020), Cellulitis of unspecified part of limb (09/23/2020), Encounter for immunization, Encounter for screening for malignant neoplasm of colon (01/15/2021), Encounter for screening for malignant neoplasm of prostate (02/26/2021), Other obesity due to excess calories (02/26/2021), Personal history of other endocrine, nutritional and metabolic disease (09/23/2020), and Presence of coronary angioplasty implant and graft (09/23/2020).    Past Surgical History:  He has a past surgical history that includes Other surgical history (09/23/2020).      Social History:  He reports that he has been smoking cigarettes. He has been exposed to tobacco smoke. He has never used smokeless tobacco. He reports current alcohol use. He reports that he does not use drugs.    Family History:  Family History   Problem Relation Name Age of Onset    Other (CABG) Father      Other (AAA) Father          Allergies:  Patient has no " known allergies.    Outpatient Medications:  Current Outpatient Medications   Medication Instructions    aspirin 81 mg, oral, Daily    lisinopril 10 mg, oral, Daily    rosuvastatin (CRESTOR) 20 mg, oral, Daily       Physical Exam:  GENERAL:  pleasant 63 year-old  HEENT: No xanthelasma  NECK: Supple, no palpable adenopathy or thyromegaly  CHEST: Clear to auscultation, respiratory effort unlabored  CARDIAC: RRR, normal S1 and S2, no audible  rub, gallop, carotids are brisk, PMI is not displaced; there is a grade 1/6 systolic murmur heard best at the RSB  ABD: Active bowel sounds, nontender, no organomegaly, no evidence of ascites  EXT: No clubbing, cyanosis, edema, or tenderness  NEURO: Awake, alert, appropriate, speech is fluent       Last Labs:  CBC -  Lab Results   Component Value Date    WBC 5.9 08/23/2023    HGB 17.3 08/23/2023    HGB 18.6 (A) 07/07/2023    HCT 51.1 08/23/2023    MCV 97 08/23/2023     08/23/2023       CMP -  Lab Results   Component Value Date    CALCIUM 9.4 08/23/2023    PROT 6.7 08/23/2023    ALBUMIN 4.3 08/23/2023    AST 16 08/23/2023    ALT 20 08/23/2023    ALKPHOS 72 08/23/2023    BILITOT 0.4 08/23/2023       LIPID PANEL -   Lab Results   Component Value Date    CHOL 174 11/30/2022    TRIG 95 11/30/2022    HDL 39.6 (A) 11/30/2022    CHHDL 4.4 11/30/2022    LDLF 115 (H) 11/30/2022    VLDL 19 11/30/2022       RENAL FUNCTION PANEL -   Lab Results   Component Value Date    GLUCOSE 102 (H) 08/23/2023     08/23/2023    K 4.6 08/23/2023     08/23/2023    CO2 28 08/23/2023    ANIONGAP 10 08/23/2023    BUN 20 08/23/2023    CREATININE 1.27 08/23/2023    GFRMALE 63 08/23/2023    CALCIUM 9.4 08/23/2023    ALBUMIN 4.3 08/23/2023        Lab Results   Component Value Date    HGBA1C 5.5 08/03/2019         No recent results to review    Assessment/Plan   Problem List Items Addressed This Visit          Cardiac and Vasculature    Hyperlipidemia    Hypertension    Relevant Medications     lisinopril 10 mg tablet    Coronary artery disease involving native coronary artery of native heart without angina pectoris - Primary    Relevant Orders    Follow Up In Cardiology    Lipid panel    Comprehensive metabolic panel       Endocrine/Metabolic    BMI 32.0-32.9,adult       Tobacco    Nicotine dependence, uncomplicated   CAD: The patient has stable, functional class I CAD, and is doing well.  No additional testing is necessary at present. Important aspects of lifestyle modification were discussed in detail with the patient.  Recent labs and testing have been reviewed.  - CMP  - Lipid  Hypertension: Actually blood pressures are low.  -Decrease lisinopril to 10 mg daily.  3.  Above all else, I advised the patient to quit tobacco, or else risk certain future cardiovascular morbidity, and/or mortality.      Johann Friedman MD

## 2024-03-19 NOTE — PATIENT INSTRUCTIONS

## 2024-03-21 ENCOUNTER — OFFICE VISIT (OUTPATIENT)
Dept: OTOLARYNGOLOGY | Facility: CLINIC | Age: 64
End: 2024-03-21
Payer: MEDICAID

## 2024-03-21 DIAGNOSIS — R49.9 HOARSENESS OR CHANGING VOICE: ICD-10-CM

## 2024-03-21 PROCEDURE — 4004F PT TOBACCO SCREEN RCVD TLK: CPT | Performed by: PHYSICIAN ASSISTANT

## 2024-03-21 PROCEDURE — 31575 DIAGNOSTIC LARYNGOSCOPY: CPT | Performed by: PHYSICIAN ASSISTANT

## 2024-03-21 PROCEDURE — 99203 OFFICE O/P NEW LOW 30 MIN: CPT | Performed by: PHYSICIAN ASSISTANT

## 2024-03-21 PROCEDURE — 3008F BODY MASS INDEX DOCD: CPT | Performed by: PHYSICIAN ASSISTANT

## 2024-03-21 NOTE — PROGRESS NOTES
Rito Palomo is a 63 y.o. year old male patient with Hoarseness     Patient presents to the office today for assessment of his voice.  The patient himself is without any concerns but states that he was sent by his primary care physician.  Patient's referral was for vocal hoarseness and voice changes but patient denies.  The patient is a non-smoker.  He is currently smoking 1/2 pack of cigarettes per day.  He is working on smoking cessation.  All other ENT issues are negative.      Review of Systems   All other systems reviewed and are negative.        Physical Exam:     General appearance: No acute distress. Normal facies. Symmetric facial movement. No gross lesions of the face are noted.  The external ear structures appear normal. The ear canals patent and the tympanic membranes are intact without evidence of air-fluid levels, retraction, or congenital defects.  Anterior rhinoscopy notes essentially a midline nasal septum. Examination is noted for normal healthy mucosal membranes without any evidence of lesions, polyps, or exudate. The tongue is normally mobile. There are no lesions on the gingiva, buccal, or oral mucosa. There are no oral cavity masses.  The neck is negative for mass lymphadenopathy. The trachea and parotid are clear. The thyroid bed is grossly unremarkable. The salivary gland structures are grossly unremarkable.  In order to assess the larynx, flexible laryngoscopy was performed based on the patient's history.    After topical anesthesia, a very complete flexible laryngoscopy was performed. This examination reveals a normal appearance to the laryngeal structures including the true cords, false cords, epiglottis, base of tongue, and piriform sinus, except as noted.    Assessment/Plan   1.  Vocal hoarseness    Patient seen in the office today for assessment of his voice.  Physical examination including flexible laryngoscope is unremarkable.  Patient given reassurance and we did discuss smoking  cessation.  I recommend observation and follow-up as needed

## 2024-03-22 ENCOUNTER — ALLIED HEALTH (OUTPATIENT)
Dept: INTEGRATIVE MEDICINE | Facility: CLINIC | Age: 64
End: 2024-03-22

## 2024-03-22 DIAGNOSIS — F17.200 TOBACCO USE DISORDER: Primary | ICD-10-CM

## 2024-03-22 PROCEDURE — 97139 UNLISTED THERAPEUTIC PX: CPT

## 2024-03-22 NOTE — PROGRESS NOTES
"Acupuncture Visit:     Subjective   Patient ID: Rito Palomo is a 63 y.o. male who presents for Nicotine Dependence    Update: 03/22/24    Feeling a bit stressed due to billing issues from .  So far, smoking about the same.    Update: 03/01/24    Patient reports he continues to make progress although slowly.    Update: 02/23/24    Patient reports he is slowly making the step to cut back on alcohol and coffee intake because he states they normally lead to him smoking.    Update: 02/16/24    Hasn't had a cigarette craving in recent weeks but notes he is still smoking.  Scheduled for a cardiology visit in mid March and nervous about still smoking when that time comes.    Update: 01/26/24    Hasn't had the urge to smoke as he normally did before last visit.  He notes he has been exercising more patience and mindfulness with his habits overall.    Initial Visit: 1/19/24    MC: Smoking cessation    Patient was referred by his PCP.  He verbally expressed interest in quitting smoking and states he wants to quit for economic reasons.  Has tried quitting in the past but \"not hard enough.\"  Currently smokes under a pack a day.  Works as a .        Session Information  Is this acupuncture treatment being billed to the patient's insurance company: No  Visit Type: Follow-up visit  Medical History Reviewed: I have reviewed pertinent medical history in EHR, and no contraindications are present to provide treatment         Review of Systems         Provider reviewed plan for the acupuncture session, precautions and contraindications. Patient/guardian/hospital staff has given consent to treat with full understanding of what to expect during the session. Before acupuncture began, provider explained to the patient to communicate at any time if the procedure was causing discomfort past their tolerance level. Patient agreed to advise acupuncturist. The acupuncturist counseled the patient on the risks of acupuncture " treatment including pain, infection, bleeding, and no relief of pain. The patient was positioned comfortably. There was no evidence of infection at the site of needle insertions.    Objective   Physical Exam         Treatment Plan  Treatment Goals: Wellbeing improvement, Stress reduction, Relaxation    Acupuncture Treatment  Patient Position: Seated and reclining  Acupuncture Needling: Yes  Needle Guage: 40 guage /.16/ Red seirin, 32 guage /.25/ Purple seirin  Body Points: With retention  Body Points - Bilateral: 4 ritchie; Timmee  Auricular Points: Yes  Auricular Points - Bilateral: NADA Protocol  Electroacupuncture Used: No  Needle Count In: 16  Needle Count Out: 16  Needle Retention Time (min): 30 minutes  Total Face to Face Time (min): 25 minutes              Assessment/Plan

## 2024-03-29 ENCOUNTER — ALLIED HEALTH (OUTPATIENT)
Dept: INTEGRATIVE MEDICINE | Facility: CLINIC | Age: 64
End: 2024-03-29

## 2024-03-29 DIAGNOSIS — F17.200 TOBACCO USE DISORDER: Primary | ICD-10-CM

## 2024-03-29 PROCEDURE — 97139 UNLISTED THERAPEUTIC PX: CPT

## 2024-03-29 NOTE — PROGRESS NOTES
"Acupuncture Visit:     Subjective   Patient ID: Rito Palomo is a 63 y.o. male who presents for Nicotine Dependence    Update: 03/29/24    Patient reports he is doing \"great\" but notes he still struggles with quitting.    Update: 03/22/24    Feeling a bit stressed due to billing issues from .  So far, smoking about the same.    Update: 03/01/24    Patient reports he continues to make progress although slowly.    Update: 02/23/24    Patient reports he is slowly making the step to cut back on alcohol and coffee intake because he states they normally lead to him smoking.    Update: 02/16/24    Hasn't had a cigarette craving in recent weeks but notes he is still smoking.  Scheduled for a cardiology visit in mid March and nervous about still smoking when that time comes.    Update: 01/26/24    Hasn't had the urge to smoke as he normally did before last visit.  He notes he has been exercising more patience and mindfulness with his habits overall.    Initial Visit: 1/19/24    MC: Smoking cessation    Patient was referred by his PCP.  He verbally expressed interest in quitting smoking and states he wants to quit for economic reasons.  Has tried quitting in the past but \"not hard enough.\"  Currently smokes under a pack a day.  Works as a .        Session Information  Is this acupuncture treatment being billed to the patient's insurance company: No  Visit Type: Follow-up visit  Medical History Reviewed: I have reviewed pertinent medical history in EHR, and no contraindications are present to provide treatment         Review of Systems         Provider reviewed plan for the acupuncture session, precautions and contraindications. Patient/guardian/hospital staff has given consent to treat with full understanding of what to expect during the session. Before acupuncture began, provider explained to the patient to communicate at any time if the procedure was causing discomfort past their tolerance level. Patient " agreed to advise acupuncturist. The acupuncturist counseled the patient on the risks of acupuncture treatment including pain, infection, bleeding, and no relief of pain. The patient was positioned comfortably. There was no evidence of infection at the site of needle insertions.    Objective   Physical Exam         Treatment Plan  Treatment Goals: Pain management, Wellbeing improvement, Stress reduction, Relaxation, Coping    Acupuncture Treatment  Patient Position: Seated and reclining  Acupuncture Needling: Yes  Needle Guage: 40 guage /.16/ Red seirin, 32 guage /.25/ Purple seirin  Body Points: With retention  Body Points - Bilateral: 4 ritchie; LU9; ST36  Auricular Points: Yes  Auricular Points - Bilateral: NADA Protocol  Electroacupuncture Used: No  Needle Count In: 18  Needle Count Out: 18  Needle Retention Time (min): 30 minutes  Total Face to Face Time (min): 25 minutes              Assessment/Plan

## 2024-04-12 ENCOUNTER — ALLIED HEALTH (OUTPATIENT)
Dept: INTEGRATIVE MEDICINE | Facility: CLINIC | Age: 64
End: 2024-04-12

## 2024-04-12 DIAGNOSIS — F17.200 TOBACCO USE DISORDER: Primary | ICD-10-CM

## 2024-04-12 PROCEDURE — 97139 UNLISTED THERAPEUTIC PX: CPT

## 2024-04-12 NOTE — PROGRESS NOTES
"Acupuncture Visit:     Subjective   Patient ID: Rito Palomo is a 63 y.o. male who presents for Nicotine Dependence    Update: 04/12/24    Doing well today.  Ongoing support for smoking cessation.     Update: 03/29/24    Patient reports he is doing \"great\" but notes he still struggles with quitting.    Update: 03/22/24    Feeling a bit stressed due to billing issues from .  So far, smoking about the same.    Update: 03/01/24    Patient reports he continues to make progress although slowly.    Update: 02/23/24    Patient reports he is slowly making the step to cut back on alcohol and coffee intake because he states they normally lead to him smoking.    Update: 02/16/24    Hasn't had a cigarette craving in recent weeks but notes he is still smoking.  Scheduled for a cardiology visit in mid March and nervous about still smoking when that time comes.    Update: 01/26/24    Hasn't had the urge to smoke as he normally did before last visit.  He notes he has been exercising more patience and mindfulness with his habits overall.    Initial Visit: 1/19/24    MC: Smoking cessation    Patient was referred by his PCP.  He verbally expressed interest in quitting smoking and states he wants to quit for economic reasons.  Has tried quitting in the past but \"not hard enough.\"  Currently smokes under a pack a day.  Works as a .        Session Information  Is this acupuncture treatment being billed to the patient's insurance company: No  Visit Type: Follow-up visit  Medical History Reviewed: I have reviewed pertinent medical history in EHR, and no contraindications are present to provide treatment         Review of Systems         Provider reviewed plan for the acupuncture session, precautions and contraindications. Patient/guardian/hospital staff has given consent to treat with full understanding of what to expect during the session. Before acupuncture began, provider explained to the patient to communicate at any " time if the procedure was causing discomfort past their tolerance level. Patient agreed to advise acupuncturist. The acupuncturist counseled the patient on the risks of acupuncture treatment including pain, infection, bleeding, and no relief of pain. The patient was positioned comfortably. There was no evidence of infection at the site of needle insertions.    Objective   Physical Exam         Treatment Plan  Treatment Goals: Wellbeing improvement, Stress reduction, Relaxation, Coping    Acupuncture Treatment  Patient Position: Seated and reclining  Acupuncture Needling: Yes  Needle Guage: 40 guage /.16/ Red seirin, 32 guage /.25/ Purple seirin  Body Points: With retention  Body Points - Bilateral: 4 ritchie  Auricular Points: Yes  Auricular Points - Bilateral: NADA Protocol  Electroacupuncture Used: No  Needle Count In: 14  Needle Count Out: 14  Needle Retention Time (min): 30 minutes  Total Face to Face Time (min): 25 minutes              Assessment/Plan

## 2024-04-19 ENCOUNTER — ALLIED HEALTH (OUTPATIENT)
Dept: INTEGRATIVE MEDICINE | Facility: CLINIC | Age: 64
End: 2024-04-19

## 2024-04-19 DIAGNOSIS — F17.200 TOBACCO USE DISORDER: Primary | ICD-10-CM

## 2024-04-19 PROCEDURE — 97139 UNLISTED THERAPEUTIC PX: CPT

## 2024-04-19 NOTE — PROGRESS NOTES
"Acupuncture Visit:     Subjective   Patient ID: Rito Palomo is a 63 y.o. male who presents for Nicotine Dependence    Update: 04/19/24    Patient is here for continued support for nicotine dependence.    Update: 04/12/24    Doing well today.  Ongoing support for smoking cessation.     Update: 03/29/24    Patient reports he is doing \"great\" but notes he still struggles with quitting.    Update: 03/22/24    Feeling a bit stressed due to billing issues from .  So far, smoking about the same.    Update: 03/01/24    Patient reports he continues to make progress although slowly.    Update: 02/23/24    Patient reports he is slowly making the step to cut back on alcohol and coffee intake because he states they normally lead to him smoking.    Update: 02/16/24    Hasn't had a cigarette craving in recent weeks but notes he is still smoking.  Scheduled for a cardiology visit in mid March and nervous about still smoking when that time comes.    Update: 01/26/24    Hasn't had the urge to smoke as he normally did before last visit.  He notes he has been exercising more patience and mindfulness with his habits overall.    Initial Visit: 1/19/24    MC: Smoking cessation    Patient was referred by his PCP.  He verbally expressed interest in quitting smoking and states he wants to quit for economic reasons.  Has tried quitting in the past but \"not hard enough.\"  Currently smokes under a pack a day.  Works as a .        Session Information  Is this acupuncture treatment being billed to the patient's insurance company: No  Visit Type: Follow-up visit  Medical History Reviewed: I have reviewed pertinent medical history in EHR, and no contraindications are present to provide treatment         Review of Systems         Provider reviewed plan for the acupuncture session, precautions and contraindications. Patient/guardian/hospital staff has given consent to treat with full understanding of what to expect during the " session. Before acupuncture began, provider explained to the patient to communicate at any time if the procedure was causing discomfort past their tolerance level. Patient agreed to advise acupuncturist. The acupuncturist counseled the patient on the risks of acupuncture treatment including pain, infection, bleeding, and no relief of pain. The patient was positioned comfortably. There was no evidence of infection at the site of needle insertions.    Objective   Physical Exam         Treatment Plan  Treatment Goals: Wellbeing improvement, Stress reduction, Relaxation    Acupuncture Treatment  Patient Position: Seated and reclining  Acupuncture Needling: Yes  Needle Guage: 40 guage /.16/ Red seirin, 32 guage /.25/ Purple seirin  Body Points: With retention  Body Points - Bilateral: 4 ritchie  Auricular Points: Yes  Auricular Points - Bilateral: NADA Protocol  Electroacupuncture Used: No  Needle Count In: 14  Needle Count Out: 14  Needle Retention Time (min): 30 minutes  Total Face to Face Time (min): 25 minutes              Assessment/Plan

## 2024-04-26 ENCOUNTER — ALLIED HEALTH (OUTPATIENT)
Dept: INTEGRATIVE MEDICINE | Facility: CLINIC | Age: 64
End: 2024-04-26

## 2024-04-26 DIAGNOSIS — J41.8 MIXED SIMPLE AND MUCOPURULENT CHRONIC BRONCHITIS (MULTI): ICD-10-CM

## 2024-04-26 DIAGNOSIS — F17.200 TOBACCO USE DISORDER: Primary | ICD-10-CM

## 2024-04-26 PROCEDURE — 97139 UNLISTED THERAPEUTIC PX: CPT

## 2024-04-26 NOTE — PROGRESS NOTES
"Acupuncture Visit:     Subjective   Patient ID: Rito Palomo is a 63 y.o. male who presents for Nicotine Dependence    Update: 04/26/24    States he is getting ready to put a date on quitting as he continues to struggle with reducing the amount of cigarettes smoked per day.    Update: 04/19/24    Patient is here for continued support for nicotine dependence.    Update: 04/12/24    Doing well today.  Ongoing support for smoking cessation.     Update: 03/29/24    Patient reports he is doing \"great\" but notes he still struggles with quitting.    Update: 03/22/24    Feeling a bit stressed due to billing issues from .  So far, smoking about the same.    Update: 03/01/24    Patient reports he continues to make progress although slowly.    Update: 02/23/24    Patient reports he is slowly making the step to cut back on alcohol and coffee intake because he states they normally lead to him smoking.    Update: 02/16/24    Hasn't had a cigarette craving in recent weeks but notes he is still smoking.  Scheduled for a cardiology visit in mid March and nervous about still smoking when that time comes.    Update: 01/26/24    Hasn't had the urge to smoke as he normally did before last visit.  He notes he has been exercising more patience and mindfulness with his habits overall.    Initial Visit: 1/19/24    MC: Smoking cessation    Patient was referred by his PCP.  He verbally expressed interest in quitting smoking and states he wants to quit for economic reasons.  Has tried quitting in the past but \"not hard enough.\"  Currently smokes under a pack a day.  Works as a .        Session Information  Is this acupuncture treatment being billed to the patient's insurance company: No  Visit Type: Follow-up visit  Medical History Reviewed: I have reviewed pertinent medical history in EHR, and no contraindications are present to provide treatment         Review of Systems         Provider reviewed plan for the acupuncture " session, precautions and contraindications. Patient/guardian/hospital staff has given consent to treat with full understanding of what to expect during the session. Before acupuncture began, provider explained to the patient to communicate at any time if the procedure was causing discomfort past their tolerance level. Patient agreed to advise acupuncturist. The acupuncturist counseled the patient on the risks of acupuncture treatment including pain, infection, bleeding, and no relief of pain. The patient was positioned comfortably. There was no evidence of infection at the site of needle insertions.    Objective   Physical Exam         Treatment Plan  Treatment Goals: Wellbeing improvement, Coping, Relaxation, Stress reduction    Acupuncture Treatment  Patient Position: Seated and reclining  Acupuncture Needling: Yes  Needle Guage: 32 guage /.25/ Purple seirin, 40 guage /.16/ Red seirin  Body Points: With retention  Body Points - Bilateral: 4 ritchie; LU5,9; ST40  Auricular Points: Yes  Auricular Points - Bilateral: NADA Protocol  Electroacupuncture Used: No  Needle Count In: 20  Needle Count Out: 20  Needle Retention Time (min): 30 minutes  Total Face to Face Time (min): 25 minutes              Assessment/Plan

## 2024-05-01 PROBLEM — Z95.5 HISTORY OF CORONARY ARTERY STENT PLACEMENT: Status: RESOLVED | Noted: 2022-12-21 | Resolved: 2024-05-01

## 2024-05-02 ENCOUNTER — OFFICE VISIT (OUTPATIENT)
Dept: PRIMARY CARE | Facility: CLINIC | Age: 64
End: 2024-05-02
Payer: MEDICAID

## 2024-05-02 ENCOUNTER — TELEPHONE (OUTPATIENT)
Dept: PRIMARY CARE | Facility: CLINIC | Age: 64
End: 2024-05-02

## 2024-05-02 VITALS
TEMPERATURE: 98.6 F | HEART RATE: 45 BPM | DIASTOLIC BLOOD PRESSURE: 60 MMHG | BODY MASS INDEX: 32.34 KG/M2 | SYSTOLIC BLOOD PRESSURE: 120 MMHG | WEIGHT: 231 LBS | OXYGEN SATURATION: 95 % | HEIGHT: 71 IN

## 2024-05-02 DIAGNOSIS — I10 PRIMARY HYPERTENSION: Primary | ICD-10-CM

## 2024-05-02 DIAGNOSIS — R97.20 ELEVATED PSA: ICD-10-CM

## 2024-05-02 DIAGNOSIS — E78.2 MIXED HYPERLIPIDEMIA: ICD-10-CM

## 2024-05-02 DIAGNOSIS — F17.210 CIGARETTE NICOTINE DEPENDENCE WITHOUT COMPLICATION: ICD-10-CM

## 2024-05-02 PROCEDURE — 99214 OFFICE O/P EST MOD 30 MIN: CPT | Performed by: FAMILY MEDICINE

## 2024-05-02 ASSESSMENT — ENCOUNTER SYMPTOMS
PALPITATIONS: 0
ARTHRALGIAS: 0
ABDOMINAL PAIN: 0
SEIZURES: 0
DIARRHEA: 0
FLANK PAIN: 0
POLYDIPSIA: 0
COUGH: 0
CONSTIPATION: 0
ABDOMINAL DISTENTION: 0
APPETITE CHANGE: 0
CHEST TIGHTNESS: 0
SINUS PAIN: 0
ADENOPATHY: 0
AGITATION: 0
SINUS PRESSURE: 0
DECREASED CONCENTRATION: 0
CONFUSION: 0
FEVER: 0
SPEECH DIFFICULTY: 0
CONSTITUTIONAL NEGATIVE: 1
MYALGIAS: 0
STRIDOR: 0
HEADACHES: 0
NERVOUS/ANXIOUS: 0
HEMATURIA: 0
DIZZINESS: 0
EYE PAIN: 0
BLOOD IN STOOL: 0
SLEEP DISTURBANCE: 0
DYSURIA: 0
RECTAL PAIN: 0
TROUBLE SWALLOWING: 0
ACTIVITY CHANGE: 0
POLYPHAGIA: 0
DYSPHORIC MOOD: 0
SORE THROAT: 0
SHORTNESS OF BREATH: 0
PHOTOPHOBIA: 0
COLOR CHANGE: 0
FATIGUE: 0
NECK STIFFNESS: 0

## 2024-05-02 ASSESSMENT — PATIENT HEALTH QUESTIONNAIRE - PHQ9
1. LITTLE INTEREST OR PLEASURE IN DOING THINGS: NOT AT ALL
2. FEELING DOWN, DEPRESSED OR HOPELESS: NOT AT ALL
SUM OF ALL RESPONSES TO PHQ9 QUESTIONS 1 AND 2: 0

## 2024-05-02 NOTE — TELEPHONE ENCOUNTER
Pt needs CT lung screening order, we can not schedule the original order because of the way this was put in. Once order is corrected, we will contact pt to schedule.

## 2024-05-02 NOTE — PROGRESS NOTES
"Subjective   Patient ID: Rito Palomo is a 63 y.o. male who presents for Hypertension and Hyperlipidemia.    Patient presents today to follow up on hypertension and hyperlipidemia. Patient denies any chest pain, SOB, dizziness, edema or headaches. Patient did have a follow up with Dr. Friedman in March. His Lisinopril was decreased to 10mg once daily.     Patient denies any concerns or refills today          Review of Systems   Constitutional: Negative.  Negative for activity change, appetite change, fatigue and fever.   HENT:  Positive for rhinorrhea. Negative for congestion, dental problem, ear discharge, ear pain, mouth sores, sinus pressure, sinus pain, sore throat, tinnitus and trouble swallowing.    Eyes:  Negative for photophobia, pain and visual disturbance.   Respiratory:  Negative for cough, chest tightness, shortness of breath and stridor.    Cardiovascular:  Negative for chest pain and palpitations.   Gastrointestinal:  Negative for abdominal distention, abdominal pain, blood in stool, constipation, diarrhea and rectal pain.   Endocrine: Negative for cold intolerance, heat intolerance, polydipsia, polyphagia and polyuria.   Genitourinary:  Negative for dysuria, flank pain, hematuria and urgency.   Musculoskeletal:  Negative for arthralgias, gait problem, myalgias and neck stiffness.   Skin:  Negative for color change and rash.   Allergic/Immunologic: Negative for environmental allergies and food allergies.   Neurological:  Negative for dizziness, seizures, syncope, speech difficulty and headaches.   Hematological:  Negative for adenopathy.   Psychiatric/Behavioral:  Negative for agitation, confusion, decreased concentration, dysphoric mood and sleep disturbance. The patient is not nervous/anxious.        Objective   /60 (BP Location: Right arm, Patient Position: Sitting, BP Cuff Size: Adult)   Pulse (!) 45   Temp 37 °C (98.6 °F)   Ht 1.803 m (5' 11\")   Wt 105 kg (231 lb)   SpO2 95%   BMI " 32.22 kg/m²     Physical Exam  Vitals reviewed.   Constitutional:       General: He is not in acute distress.     Appearance: He is obese. He is not ill-appearing or diaphoretic.   HENT:      Head: Normocephalic.      Right Ear: Tympanic membrane and external ear normal.      Left Ear: Tympanic membrane and external ear normal.      Nose: Nose normal. No congestion.      Mouth/Throat:      Pharynx: No posterior oropharyngeal erythema.   Eyes:      General:         Right eye: No discharge.         Left eye: No discharge.      Extraocular Movements: Extraocular movements intact.      Conjunctiva/sclera: Conjunctivae normal.      Pupils: Pupils are equal, round, and reactive to light.   Cardiovascular:      Rate and Rhythm: Normal rate and regular rhythm.      Pulses: Normal pulses.      Heart sounds: Normal heart sounds. No murmur heard.  Pulmonary:      Effort: Pulmonary effort is normal. No respiratory distress.      Breath sounds: Normal breath sounds. No wheezing or rales.   Chest:      Chest wall: No tenderness.   Abdominal:      General: Bowel sounds are normal. There is distension.      Palpations: There is no mass.      Tenderness: There is no abdominal tenderness. There is no guarding.   Musculoskeletal:         General: No tenderness. Normal range of motion.      Cervical back: Normal range of motion and neck supple. No tenderness.      Right lower leg: No edema.      Left lower leg: No edema.   Skin:     General: Skin is dry.      Coloration: Skin is not jaundiced.      Findings: No bruising, erythema or rash.   Neurological:      General: No focal deficit present.      Mental Status: He is alert and oriented to person, place, and time. Mental status is at baseline.      Cranial Nerves: No cranial nerve deficit.      Sensory: No sensory deficit.      Coordination: Coordination normal.      Gait: Gait normal.   Psychiatric:         Mood and Affect: Mood normal.         Thought Content: Thought content  normal.         Judgment: Judgment normal.         Assessment/Plan   Problem List Items Addressed This Visit             ICD-10-CM    Hyperlipidemia E78.5    Relevant Orders    Lipid Panel    Comprehensive Metabolic Panel    Hypertension - Primary I10    Nicotine dependence, uncomplicated F17.200    Relevant Orders    CT lung screening low dose     Other Visit Diagnoses         Codes    Elevated PSA     R97.20    Relevant Orders    Prostate Specific Antigen, Screen          Scribe Attestation  By signing my name below, I, Topher Lou DO , Scribe   attest that this documentation has been prepared under the direction and in the presence of Topher Lou DO.    Provider Attestation - Scribe documentation    All medical record entries made by the Scribe were at my direction and personally dictated by me. I have reviewed the chart and agree that the record accurately reflects my personal performance of the history, physical exam, discussion and plan.

## 2024-05-02 NOTE — PATIENT INSTRUCTIONS
Follow up in 3 months    Continue current medications and therapy for chronic medical conditions.    Patient was advised importance of proper diet/nutrition in addition adequate hydration. Patient was encouraged moderate exercise program to include 30 minutes daily for 5 days of the week or 150 minutes weekly. Patient will follow-up with us as scheduled.    CT lung screening due July 2024    Review lab results from August 2023    Obtain laboratory to include fasting lipid profile, CMP and PSA

## 2024-05-03 ENCOUNTER — APPOINTMENT (OUTPATIENT)
Dept: INTEGRATIVE MEDICINE | Facility: CLINIC | Age: 64
End: 2024-05-03
Payer: MEDICAID

## 2024-05-05 ASSESSMENT — ENCOUNTER SYMPTOMS: RHINORRHEA: 1

## 2024-09-03 ENCOUNTER — APPOINTMENT (OUTPATIENT)
Dept: PRIMARY CARE | Facility: CLINIC | Age: 64
End: 2024-09-03
Payer: MEDICAID

## 2024-09-03 DIAGNOSIS — I10 PRIMARY HYPERTENSION: Primary | ICD-10-CM

## 2024-09-03 DIAGNOSIS — E66.09 EXOGENOUS OBESITY: ICD-10-CM

## 2024-09-03 DIAGNOSIS — E78.2 MIXED HYPERLIPIDEMIA: ICD-10-CM

## 2024-09-03 PROCEDURE — 99214 OFFICE O/P EST MOD 30 MIN: CPT | Performed by: FAMILY MEDICINE

## 2024-09-03 PROCEDURE — 1159F MED LIST DOCD IN RCRD: CPT | Performed by: FAMILY MEDICINE

## 2024-09-03 PROCEDURE — 3075F SYST BP GE 130 - 139MM HG: CPT | Performed by: FAMILY MEDICINE

## 2024-09-03 PROCEDURE — 1158F ADVNC CARE PLAN TLK DOCD: CPT | Performed by: FAMILY MEDICINE

## 2024-09-03 PROCEDURE — G2211 COMPLEX E/M VISIT ADD ON: HCPCS | Performed by: FAMILY MEDICINE

## 2024-09-03 PROCEDURE — 3008F BODY MASS INDEX DOCD: CPT | Performed by: FAMILY MEDICINE

## 2024-09-03 PROCEDURE — 3078F DIAST BP <80 MM HG: CPT | Performed by: FAMILY MEDICINE

## 2024-09-03 PROCEDURE — 1160F RVW MEDS BY RX/DR IN RCRD: CPT | Performed by: FAMILY MEDICINE

## 2024-09-03 PROCEDURE — 1123F ACP DISCUSS/DSCN MKR DOCD: CPT | Performed by: FAMILY MEDICINE

## 2024-09-03 RX ORDER — ROSUVASTATIN CALCIUM 20 MG/1
20 TABLET, COATED ORAL DAILY
Qty: 90 TABLET | Refills: 1 | Status: SHIPPED | OUTPATIENT
Start: 2024-09-03

## 2024-09-03 RX ORDER — LISINOPRIL 20 MG/1
20 TABLET ORAL DAILY
Qty: 90 TABLET | Refills: 1 | Status: SHIPPED | OUTPATIENT
Start: 2024-09-03 | End: 2025-03-02

## 2024-09-03 ASSESSMENT — ENCOUNTER SYMPTOMS
HEMATURIA: 0
COUGH: 0
SLEEP DISTURBANCE: 0
POLYDIPSIA: 0
ACTIVITY CHANGE: 0
DYSURIA: 0
STRIDOR: 0
PHOTOPHOBIA: 0
SHORTNESS OF BREATH: 0
SINUS PRESSURE: 0
POLYPHAGIA: 0
CONSTITUTIONAL NEGATIVE: 1
NECK STIFFNESS: 0
FLANK PAIN: 0
CHEST TIGHTNESS: 0
ABDOMINAL PAIN: 0
CONSTIPATION: 0
AGITATION: 0
SPEECH DIFFICULTY: 0
SINUS PAIN: 0
DIZZINESS: 0
HEADACHES: 0
SEIZURES: 0
RECTAL PAIN: 0
ARTHRALGIAS: 0
RHINORRHEA: 0
DYSPHORIC MOOD: 0
PALPITATIONS: 0
BLOOD IN STOOL: 0
SORE THROAT: 0
COLOR CHANGE: 0
ADENOPATHY: 0
DIARRHEA: 0
CONFUSION: 0
NERVOUS/ANXIOUS: 0
FEVER: 0
ABDOMINAL DISTENTION: 0
EYE PAIN: 0
FATIGUE: 0
MYALGIAS: 0
APPETITE CHANGE: 0
DECREASED CONCENTRATION: 0
TROUBLE SWALLOWING: 0

## 2024-09-03 NOTE — PROGRESS NOTES
"Subjective   Patient ID: Rito Palomo is a 64 y.o. male who presents for Hypertension.    HPI   The patient is in office for HTN follow up. He states he does not take his blood pressure sat home.     The patient does not have any other question or concerns.     Review of Systems   Constitutional: Negative.  Negative for activity change, appetite change, fatigue and fever.   HENT:  Negative for congestion, dental problem, ear discharge, ear pain, mouth sores, rhinorrhea, sinus pressure, sinus pain, sore throat, tinnitus and trouble swallowing.    Eyes:  Negative for photophobia, pain and visual disturbance.   Respiratory:  Negative for cough, chest tightness, shortness of breath and stridor.    Cardiovascular:  Negative for chest pain and palpitations.   Gastrointestinal:  Negative for abdominal distention, abdominal pain, blood in stool, constipation, diarrhea and rectal pain.   Endocrine: Negative for cold intolerance, heat intolerance, polydipsia, polyphagia and polyuria.   Genitourinary:  Negative for dysuria, flank pain, hematuria and urgency.   Musculoskeletal:  Negative for arthralgias, gait problem, myalgias and neck stiffness.   Skin:  Negative for color change and rash.   Allergic/Immunologic: Negative for environmental allergies and food allergies.   Neurological:  Negative for dizziness, seizures, syncope, speech difficulty and headaches.   Hematological:  Negative for adenopathy.   Psychiatric/Behavioral:  Negative for agitation, confusion, decreased concentration, dysphoric mood and sleep disturbance. The patient is not nervous/anxious.        Objective   /74   Pulse 54   Temp 37.1 °C (98.7 °F) (Temporal)   Resp 16   Ht 1.803 m (5' 11\")   Wt 106 kg (232 lb 12.8 oz)   SpO2 96%   BMI 32.47 kg/m²     Physical Exam  Vitals reviewed.   Constitutional:       General: He is not in acute distress.     Appearance: He is obese. He is not ill-appearing or diaphoretic.   HENT:      Head: " Normocephalic.      Right Ear: Tympanic membrane and external ear normal.      Left Ear: Tympanic membrane and external ear normal.      Nose: Nose normal. No congestion.      Mouth/Throat:      Pharynx: No posterior oropharyngeal erythema.   Eyes:      General:         Right eye: No discharge.         Left eye: No discharge.      Extraocular Movements: Extraocular movements intact.      Conjunctiva/sclera: Conjunctivae normal.      Pupils: Pupils are equal, round, and reactive to light.   Cardiovascular:      Rate and Rhythm: Normal rate and regular rhythm.      Pulses: Normal pulses.      Heart sounds: Normal heart sounds. No murmur heard.  Pulmonary:      Effort: No respiratory distress.      Breath sounds: No wheezing or rales.      Comments: Diminished breath sounds bilaterally, respiratory rate 18  Chest:      Chest wall: No tenderness.   Abdominal:      General: Bowel sounds are normal. There is distension.      Palpations: There is no mass.      Tenderness: There is no abdominal tenderness. There is no guarding.   Musculoskeletal:         General: No tenderness. Normal range of motion.      Cervical back: Normal range of motion and neck supple. No tenderness.      Right lower leg: No edema.      Left lower leg: No edema.   Skin:     General: Skin is dry.      Coloration: Skin is not jaundiced.      Findings: No bruising, erythema or rash.   Neurological:      General: No focal deficit present.      Mental Status: He is alert and oriented to person, place, and time. Mental status is at baseline.      Cranial Nerves: No cranial nerve deficit.      Sensory: No sensory deficit.      Coordination: Coordination normal.      Gait: Gait normal.   Psychiatric:         Mood and Affect: Mood normal.         Thought Content: Thought content normal.         Judgment: Judgment normal.         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   Provider Attestation - Scribe documentation    All medical record entries made by the Scribe were at my direction and personally dictated by me. I have reviewed the chart and agree that the record accurately reflects my personal performance of the history, physical exam, discussion and plan.

## 2024-09-03 NOTE — PATIENT INSTRUCTIONS
Follow up in 3 months    Continue current medications and therapy for chronic medical conditions.    Patient was advised importance of proper diet/nutrition in addition adequate hydration. Patient was encouraged moderate exercise program to include 30 minutes daily for 5 days of the week or 150 minutes weekly. Patient will follow-up with us as scheduled.    Increase Lisinopril to 20mg once daily     Rosuvastatin 20 mg daily

## 2024-09-04 VITALS
HEIGHT: 71 IN | WEIGHT: 232.8 LBS | TEMPERATURE: 98.7 F | SYSTOLIC BLOOD PRESSURE: 138 MMHG | DIASTOLIC BLOOD PRESSURE: 74 MMHG | OXYGEN SATURATION: 96 % | HEART RATE: 54 BPM | RESPIRATION RATE: 16 BRPM | BODY MASS INDEX: 32.59 KG/M2

## 2024-12-17 ENCOUNTER — APPOINTMENT (OUTPATIENT)
Dept: PRIMARY CARE | Facility: CLINIC | Age: 64
End: 2024-12-17

## 2025-01-03 ENCOUNTER — APPOINTMENT (OUTPATIENT)
Dept: RADIOLOGY | Facility: CLINIC | Age: 65
End: 2025-01-03
Payer: MEDICARE

## 2025-01-23 ENCOUNTER — HOSPITAL ENCOUNTER (OUTPATIENT)
Dept: RADIOLOGY | Facility: CLINIC | Age: 65
Discharge: HOME | End: 2025-01-23
Payer: MEDICARE

## 2025-01-23 DIAGNOSIS — F17.210 CIGARETTE NICOTINE DEPENDENCE WITHOUT COMPLICATION: ICD-10-CM

## 2025-01-23 PROCEDURE — 71271 CT THORAX LUNG CANCER SCR C-: CPT

## 2025-03-25 ENCOUNTER — APPOINTMENT (OUTPATIENT)
Dept: CARDIOLOGY | Facility: CLINIC | Age: 65
End: 2025-03-25
Payer: MEDICAID

## 2025-03-25 VITALS
BODY MASS INDEX: 33.52 KG/M2 | WEIGHT: 239.4 LBS | HEART RATE: 48 BPM | SYSTOLIC BLOOD PRESSURE: 150 MMHG | OXYGEN SATURATION: 97 % | DIASTOLIC BLOOD PRESSURE: 86 MMHG | HEIGHT: 71 IN

## 2025-03-25 DIAGNOSIS — I10 PRIMARY HYPERTENSION: Primary | ICD-10-CM

## 2025-03-25 DIAGNOSIS — F17.200 NICOTINE DEPENDENCE, UNCOMPLICATED, UNSPECIFIED NICOTINE PRODUCT TYPE: ICD-10-CM

## 2025-03-25 DIAGNOSIS — I35.8 AORTIC SYSTOLIC MURMUR ON EXAMINATION: ICD-10-CM

## 2025-03-25 DIAGNOSIS — I25.10 CORONARY ARTERY DISEASE INVOLVING NATIVE CORONARY ARTERY OF NATIVE HEART WITHOUT ANGINA PECTORIS: ICD-10-CM

## 2025-03-25 DIAGNOSIS — E78.49 OTHER HYPERLIPIDEMIA: ICD-10-CM

## 2025-03-25 PROCEDURE — 3077F SYST BP >= 140 MM HG: CPT | Performed by: INTERNAL MEDICINE

## 2025-03-25 PROCEDURE — G2211 COMPLEX E/M VISIT ADD ON: HCPCS | Performed by: INTERNAL MEDICINE

## 2025-03-25 PROCEDURE — 99215 OFFICE O/P EST HI 40 MIN: CPT | Performed by: INTERNAL MEDICINE

## 2025-03-25 PROCEDURE — 3079F DIAST BP 80-89 MM HG: CPT | Performed by: INTERNAL MEDICINE

## 2025-03-25 PROCEDURE — 3008F BODY MASS INDEX DOCD: CPT | Performed by: INTERNAL MEDICINE

## 2025-03-25 PROCEDURE — 1159F MED LIST DOCD IN RCRD: CPT | Performed by: INTERNAL MEDICINE

## 2025-03-25 PROCEDURE — 1160F RVW MEDS BY RX/DR IN RCRD: CPT | Performed by: INTERNAL MEDICINE

## 2025-03-25 PROCEDURE — 1123F ACP DISCUSS/DSCN MKR DOCD: CPT | Performed by: INTERNAL MEDICINE

## 2025-03-25 NOTE — PROGRESS NOTES
"Chief Complaint:   Please See Below     History Of Present Illness:    Rito Palomo is a 64 y.o. male presenting with CAD.    This 64-year-old hypertensive, hyperlipidemic smoker with a BMI of 33.4, sleep apnea, mild aortic stenosis, and coronary artery disease with prior bare-metal stent of the LAD in January 2014 returns to the office in routine follow-up. He has treated ROBERT.  The patient's SPECT in February 2023 disclosed no ischemia with an ejection fraction of 59%.    The patient denies chest discomfort, dyspnea, palpitations, orthopnea, PND, syncope, and near syncope.  [facility]      His blood pressure is a bit high today.  He does salt his food.  He does not check his blood pressure regularly at home.  The patient is still smoking despite my warnings.           Last Recorded Vitals:  Vitals:    03/25/25 1049   BP: 150/86   BP Location: Right arm   Patient Position: Sitting   Pulse: (!) 48   SpO2: 97%   Weight: 109 kg (239 lb 6.4 oz)   Height: 1.803 m (5' 11\")       Past Medical History:  He has a past medical history of Body mass index (BMI) 35.0-35.9, adult (02/26/2021), Body mass index (BMI) 36.0-36.9, adult (10/28/2020), Cellulitis of left lower limb (10/28/2020), Cellulitis of right lower limb (10/28/2020), Cellulitis of unspecified part of limb (09/23/2020), Encounter for immunization, Encounter for screening for malignant neoplasm of colon (01/15/2021), Encounter for screening for malignant neoplasm of prostate (02/26/2021), History of coronary artery stent placement (12/21/2022), Other obesity due to excess calories (02/26/2021), Personal history of other endocrine, nutritional and metabolic disease (09/23/2020), and Presence of coronary angioplasty implant and graft (09/23/2020).    Past Surgical History:  He has a past surgical history that includes Other surgical history (09/23/2020).      Social History:  He reports that he has been smoking cigarettes. " He has been exposed to tobacco smoke. He has never used smokeless tobacco. He reports current alcohol use. He reports that he does not use drugs.    Family History:  Family History   Problem Relation Name Age of Onset    Other (CABG) Father      Other (AAA) Father          Allergies:  Patient has no known allergies.    Outpatient Medications:  Current Outpatient Medications   Medication Instructions    aspirin 81 mg, Daily    lisinopril 20 mg, oral, Daily    rosuvastatin (CRESTOR) 20 mg, oral, Daily       Physical Exam:  GENERAL:  pleasant 64 year-old  HEENT: No xanthelasma  NECK: Supple, no palpable adenopathy or thyromegaly  CHEST: Clear to auscultation, respiratory effort unlabored  CARDIAC: RRR, normal S1 and S2, no audible  rub, gallop, carotids are brisk, PMI is not displaced; there is a 2/6 systolic murmur at the RSB  ABD: Active bowel sounds, nontender, no organomegaly, no evidence of ascites  EXT: No clubbing, cyanosis, edema, or tenderness  NEURO: Awake, alert, appropriate, speech is fluent       Last Labs:  CBC -  Lab Results   Component Value Date    WBC 5.9 08/23/2023    HGB 17.3 08/23/2023    HGB 18.6 (A) 07/07/2023    HCT 51.1 08/23/2023    MCV 97 08/23/2023     08/23/2023       CMP -  Lab Results   Component Value Date    CALCIUM 9.4 08/23/2023    PROT 6.7 08/23/2023    ALBUMIN 4.3 08/23/2023    AST 16 08/23/2023    ALT 20 08/23/2023    ALKPHOS 72 08/23/2023    BILITOT 0.4 08/23/2023       LIPID PANEL -   Lab Results   Component Value Date    CHOL 174 11/30/2022    TRIG 95 11/30/2022    HDL 39.6 (A) 11/30/2022    CHHDL 4.4 11/30/2022    LDLF 115 (H) 11/30/2022    VLDL 19 11/30/2022       RENAL FUNCTION PANEL -   Lab Results   Component Value Date    GLUCOSE 102 (H) 08/23/2023     08/23/2023    K 4.6 08/23/2023     08/23/2023    CO2 28 08/23/2023    ANIONGAP 10 08/23/2023    BUN 20 08/23/2023    CREATININE 1.27 08/23/2023    GFRMALE 63 08/23/2023    CALCIUM 9.4 08/23/2023    ALBUMIN  4.3 08/23/2023        Lab Results   Component Value Date    HGBA1C 5.5 08/03/2019         No recent results to review    Assessment/Plan   Assessment & Plan  Coronary artery disease involving native coronary artery of native heart without angina pectoris    Orders:    Follow Up In Cardiology    Comprehensive metabolic panel; Future    Lipid panel; Future    Transthoracic Echo Complete; Future    Follow Up In Cardiology; Future    Primary hypertension  Risk factor modification: educational materials were provided to the patient.     Orders:    Follow Up In Cardiology; Future    Other hyperlipidemia    Orders:    Follow Up In Cardiology; Future    Nicotine dependence, uncomplicated, unspecified nicotine product type  Above all else, I advised the patient to quit tobacco, or else risk certain future cardiovascular morbidity, and/or mortality.    Risk factor modification: educational materials were provided to the patient.       Orders:    Referral to Tobacco Cessation Counseling; Future    Follow Up In Cardiology; Future    Aortic systolic murmur on examination    Orders:    Transthoracic Echo Complete; Future    Follow Up In Cardiology; Future    BMI 33.0-33.9,adult  Risk factor modification: educational materials were provided to the patient.     Orders:    Follow Up In Cardiology; Future          Johann Friedman MD

## 2025-03-25 NOTE — ASSESSMENT & PLAN NOTE
Above all else, I advised the patient to quit tobacco, or else risk certain future cardiovascular morbidity, and/or mortality.    Risk factor modification: educational materials were provided to the patient.       Orders:    Referral to Tobacco Cessation Counseling; Future    Follow Up In Cardiology; Future

## 2025-03-25 NOTE — ASSESSMENT & PLAN NOTE
Orders:    Follow Up In Cardiology    Comprehensive metabolic panel; Future    Lipid panel; Future    Transthoracic Echo Complete; Future    Follow Up In Cardiology; Future

## 2025-03-25 NOTE — PATIENT INSTRUCTIONS
"You should increase your intake of fresh fruits and vegetables.  Try to consume 9-12 servings per day of such foods.  You should increase your intake of deep sea fish such as salmon and tuna.  Try to get two servings per week of fish, but if you are a pregnant woman, talk to your obstetrician before increasing your fish intake.  You should increase your intake of unprocessed nuts such as walnuts or almonds.  Increase your intake of plant-based protein.  You should avoid fried foods.  Don't consume sugary or starchy foods and sugary drinks.  Avoid saturated fats.  Try not to dine at restaurants more than once per month, and don't dine at fast food places.  Try to get 7-9 hours of sleep every night.  Try to get 150 minutes per week of moderate intensity exercise (after I have cleared you to start an exercise program).  Try to maintain the appropriate weight for your height based on body mass index (BMI). Maintain your cholesterol, blood sugar, and blood pressure in the recommended respective normal ranges.  There is a wealth of information on the American Heart Association's website regarding this.  Just Google \"Life's Essential 8\" for more information.   Ask me about any of these details  if you have questions.    As your cardiologist, I will be available to you at any time to answer any question you have concerning your heart health.  My staff, Megha can also answer any questions you may have.  Best of luck.It is important for us to have an accurate list of the medications, supplements, and their doses.  It is also important for us to have an accurate list of your allergies.  Please bring this information to every appointment.  This is a vital part of the quality of care you receive through all of your providers.You need to stop smoking. As you know, smoking increases the risk of future heart attacks, strokes, cancer, and emphysema. In addition to these problems, someone who smokes a pack a day spends " $2000 per year on tobacco alone. Those costs add up, so that someone who has smoked a pack a day for twenty years has spent $40,000 out of pocket on tobacco in their lifetime. To help you quit smoking, you should call the [place] Quit Line at 6-347-QUIT-NOW. They will have other tips to help you quit. Also, there are good medicines that can help you quit.  [facility] has a tobacco clinic that can guide you through the process.  Just let me know if you'd like a referral.

## 2025-04-24 ENCOUNTER — HOSPITAL ENCOUNTER (OUTPATIENT)
Dept: CARDIOLOGY | Facility: HOSPITAL | Age: 65
Discharge: HOME | End: 2025-04-24
Payer: MEDICARE

## 2025-04-24 DIAGNOSIS — I25.111 ATHEROSCLEROTIC HEART DISEASE OF NATIVE CORONARY ARTERY WITH ANGINA PECTORIS WITH DOCUMENTED SPASM: ICD-10-CM

## 2025-04-24 DIAGNOSIS — I35.8 AORTIC SYSTOLIC MURMUR ON EXAMINATION: ICD-10-CM

## 2025-04-24 DIAGNOSIS — I25.10 CORONARY ARTERY DISEASE INVOLVING NATIVE CORONARY ARTERY OF NATIVE HEART WITHOUT ANGINA PECTORIS: ICD-10-CM

## 2025-04-24 LAB
AORTIC VALVE MEAN GRADIENT: 12 MMHG
AORTIC VALVE PEAK VELOCITY: 2.4 M/S
AV PEAK GRADIENT: 23 MMHG
AVA (PEAK VEL): 2.58 CM2
AVA (VTI): 2.16 CM2
EJECTION FRACTION APICAL 4 CHAMBER: 81.5
EJECTION FRACTION: 73 %
LEFT ATRIUM VOLUME AREA LENGTH INDEX BSA: 24.7 ML/M2
LEFT VENTRICLE INTERNAL DIMENSION DIASTOLE: 5.66 CM (ref 3.5–6)
LEFT VENTRICULAR OUTFLOW TRACT DIAMETER: 2.14 CM
LV EJECTION FRACTION BIPLANE: 82 %
MITRAL VALVE E/A RATIO: 1.26
RIGHT VENTRICLE FREE WALL PEAK S': 17.99 CM/S
RIGHT VENTRICLE PEAK SYSTOLIC PRESSURE: 48.6 MMHG
TRICUSPID ANNULAR PLANE SYSTOLIC EXCURSION: 2.7 CM

## 2025-04-24 PROCEDURE — 93306 TTE W/DOPPLER COMPLETE: CPT

## 2025-04-24 PROCEDURE — 93306 TTE W/DOPPLER COMPLETE: CPT | Performed by: INTERNAL MEDICINE

## 2025-05-06 ENCOUNTER — APPOINTMENT (OUTPATIENT)
Dept: CARDIOLOGY | Facility: CLINIC | Age: 65
End: 2025-05-06
Payer: MEDICARE

## 2025-05-06 VITALS
SYSTOLIC BLOOD PRESSURE: 142 MMHG | BODY MASS INDEX: 34.3 KG/M2 | OXYGEN SATURATION: 96 % | HEART RATE: 52 BPM | HEIGHT: 70 IN | WEIGHT: 239.6 LBS | DIASTOLIC BLOOD PRESSURE: 72 MMHG

## 2025-05-06 DIAGNOSIS — E78.49 OTHER HYPERLIPIDEMIA: ICD-10-CM

## 2025-05-06 DIAGNOSIS — I35.0 NONRHEUMATIC AORTIC VALVE STENOSIS: Primary | ICD-10-CM

## 2025-05-06 DIAGNOSIS — I35.8 AORTIC SYSTOLIC MURMUR ON EXAMINATION: ICD-10-CM

## 2025-05-06 DIAGNOSIS — F17.200 NICOTINE DEPENDENCE, UNCOMPLICATED, UNSPECIFIED NICOTINE PRODUCT TYPE: ICD-10-CM

## 2025-05-06 DIAGNOSIS — I10 PRIMARY HYPERTENSION: ICD-10-CM

## 2025-05-06 DIAGNOSIS — I25.10 CORONARY ARTERY DISEASE INVOLVING NATIVE CORONARY ARTERY OF NATIVE HEART WITHOUT ANGINA PECTORIS: ICD-10-CM

## 2025-05-06 PROCEDURE — G2211 COMPLEX E/M VISIT ADD ON: HCPCS | Performed by: INTERNAL MEDICINE

## 2025-05-06 PROCEDURE — 1160F RVW MEDS BY RX/DR IN RCRD: CPT | Performed by: INTERNAL MEDICINE

## 2025-05-06 PROCEDURE — 99215 OFFICE O/P EST HI 40 MIN: CPT | Performed by: INTERNAL MEDICINE

## 2025-05-06 PROCEDURE — 3077F SYST BP >= 140 MM HG: CPT | Performed by: INTERNAL MEDICINE

## 2025-05-06 PROCEDURE — 3008F BODY MASS INDEX DOCD: CPT | Performed by: INTERNAL MEDICINE

## 2025-05-06 PROCEDURE — 1159F MED LIST DOCD IN RCRD: CPT | Performed by: INTERNAL MEDICINE

## 2025-05-06 PROCEDURE — 3078F DIAST BP <80 MM HG: CPT | Performed by: INTERNAL MEDICINE

## 2025-05-06 RX ORDER — AMLODIPINE BESYLATE 2.5 MG/1
2.5 TABLET ORAL DAILY
Qty: 90 TABLET | Refills: 3 | Status: SHIPPED | OUTPATIENT
Start: 2025-05-06 | End: 2026-05-06

## 2025-05-06 RX ORDER — LISINOPRIL 10 MG/1
10 TABLET ORAL DAILY
COMMUNITY
Start: 2025-03-20 | End: 2025-05-06 | Stop reason: WASHOUT

## 2025-05-06 NOTE — PATIENT INSTRUCTIONS
"You should increase your intake of fresh fruits and vegetables.  Try to consume 9-12 servings per day of such foods.  You should increase your intake of deep sea fish such as salmon and tuna.  Try to get two servings per week of fish, but if you are a pregnant woman, talk to your obstetrician before increasing your fish intake.  You should increase your intake of unprocessed nuts such as walnuts or almonds.  Increase your intake of plant-based protein.  You should avoid fried foods.  Don't consume sugary or starchy foods and sugary drinks.  Avoid saturated fats.  Try not to dine at restaurants more than once per month, and don't dine at fast food places.  Try to get 7-9 hours of sleep every night.  Try to get 150 minutes per week of moderate intensity exercise (after I have cleared you to start an exercise program).  Try to maintain the appropriate weight for your height based on body mass index (BMI). Maintain your cholesterol, blood sugar, and blood pressure in the recommended respective normal ranges.  There is a wealth of information on the American Heart Association's website regarding this.  Just Google \"Life's Essential 8\" for more information.   Ask me about any of these details  if you have questions.    As your cardiologist, I will be available to you at any time to answer any question you have concerning your heart health.  My staff, Megha can also answer any questions you may have.  Best of luck.It is important for us to have an accurate list of the medications, supplements, and their doses.  It is also important for us to have an accurate list of your allergies.  Please bring this information to every appointment.  This is a vital part of the quality of care you receive through all of your providers.You need to stop smoking. As you know, smoking increases the risk of future heart attacks, strokes, cancer, and emphysema. In addition to these problems, someone who smokes a pack a day spends " $2000 per year on tobacco alone. Those costs add up, so that someone who has smoked a pack a day for twenty years has spent $40,000 out of pocket on tobacco in their lifetime. To help you quit smoking, you should call the [place] Quit Line at 1-603-QUIT-NOW. They will have other tips to help you quit. Also, there are good medicines that can help you quit.  [facility] has a tobacco clinic that can guide you through the process.  Just let me know if you'd like a referral.

## 2025-05-06 NOTE — ASSESSMENT & PLAN NOTE
Orders:    Follow Up In Cardiology    amLODIPine (Norvasc) 2.5 mg tablet; Take 1 tablet (2.5 mg) by mouth once daily.    Follow Up In Cardiology; Future

## 2025-05-06 NOTE — ASSESSMENT & PLAN NOTE
Above all else, I advised the patient to quit tobacco, or else risk certain future cardiovascular morbidity, and/or mortality.\  Orders:    Follow Up In Cardiology    Follow Up In Cardiology; Future

## 2025-05-06 NOTE — PROGRESS NOTES
"Chief Complaint:   Please See Below     History Of Present Illness:    Rito Palomo is a 64 y.o. male presenting with CAD, aortic stenosis.    This 64-year-old hypertensive, hyperlipidemic smoker with obesity, sleep apnea, mild aortic stenosis, and coronary artery disease with prior bare-metal stent of the LAD in January 2014 returns to the office in routine follow-up. He has treated ROBERT, and is sleeping well.  The patient's SPECT in February 2023 disclosed no ischemia.  The patient's echocardiogram dated April 2025 revealed an EF of 70-75%, with mild aortic stenosis (V-max = 2.4 m/s; peak gradient = 23 mm; mean gradient = 12 mm; dimensionless index = 0.6).    The patient denies chest discomfort, dyspnea, palpitations, orthopnea, PND, syncope, and near syncope.    The patient is still smoking despite my warnings.         Last Recorded Vitals:  Vitals:    05/06/25 1019   BP: 142/72   BP Location: Left arm   Patient Position: Sitting   Pulse: 52   SpO2: 96%   Weight: 109 kg (239 lb 9.6 oz)   Height: 1.778 m (5' 10\")       Past Medical History:  He has a past medical history of Body mass index (BMI) 35.0-35.9, adult (02/26/2021), Body mass index (BMI) 36.0-36.9, adult (10/28/2020), Cellulitis of left lower limb (10/28/2020), Cellulitis of right lower limb (10/28/2020), Cellulitis of unspecified part of limb (09/23/2020), Encounter for immunization, Encounter for screening for malignant neoplasm of colon (01/15/2021), Encounter for screening for malignant neoplasm of prostate (02/26/2021), History of coronary artery stent placement (12/21/2022), Other obesity due to excess calories (02/26/2021), Personal history of other endocrine, nutritional and metabolic disease (09/23/2020), and Presence of coronary angioplasty implant and graft (09/23/2020).    Past Surgical History:  He has a past surgical history that includes Other surgical history (09/23/2020).      Social History:  He reports that he has been smoking " cigarettes. He has been exposed to tobacco smoke. He has never used smokeless tobacco. He reports current alcohol use. He reports that he does not use drugs.    Family History:  Family History[1]     Allergies:  Patient has no known allergies.    Outpatient Medications:  Current Outpatient Medications   Medication Instructions    aspirin 81 mg, Daily    lisinopril 20 mg, oral, Daily    lisinopril 10 mg, Daily    rosuvastatin (CRESTOR) 20 mg, oral, Daily       Physical Exam:  GENERAL:  pleasant 64 year-old  HEENT: No xanthelasma  NECK: Supple, no palpable adenopathy or thyromegaly  CHEST: Clear to auscultation, respiratory effort unlabored  CARDIAC: RRR, normal S1 and S2, no audible  rub, gallop, carotids are brisk, PMI is not displaced; there is a grade 1 systolic murmur heard best at the RSB.  ABD: Active bowel sounds, nontender, no organomegaly, no evidence of ascites  EXT: No clubbing, cyanosis, edema, or tenderness  NEURO: Awake, alert, appropriate, speech is fluent       Last Labs:  CBC -  Lab Results   Component Value Date    WBC 5.9 08/23/2023    HGB 17.3 08/23/2023    HGB 18.6 (A) 07/07/2023    HCT 51.1 08/23/2023    MCV 97 08/23/2023     08/23/2023       CMP -  Lab Results   Component Value Date    CALCIUM 9.4 08/23/2023    PROT 6.7 08/23/2023    ALBUMIN 4.3 08/23/2023    AST 16 08/23/2023    ALT 20 08/23/2023    ALKPHOS 72 08/23/2023    BILITOT 0.4 08/23/2023       LIPID PANEL -   Lab Results   Component Value Date    CHOL 174 11/30/2022    TRIG 95 11/30/2022    HDL 39.6 (A) 11/30/2022    CHHDL 4.4 11/30/2022    LDLF 115 (H) 11/30/2022    VLDL 19 11/30/2022       RENAL FUNCTION PANEL -   Lab Results   Component Value Date    GLUCOSE 102 (H) 08/23/2023     08/23/2023    K 4.6 08/23/2023     08/23/2023    CO2 28 08/23/2023    ANIONGAP 10 08/23/2023    BUN 20 08/23/2023    CREATININE 1.27 08/23/2023    GFRMALE 63 08/23/2023    CALCIUM 9.4 08/23/2023    ALBUMIN 4.3 08/23/2023        Lab  Results   Component Value Date    HGBA1C 5.5 08/03/2019         Diagnostic review: I have independently interpreted the Echocardiogram .  My findings are  as summarized in the HPI..    Assessment/Plan   Assessment & Plan  Coronary artery disease involving native coronary artery of native heart without angina pectoris    Orders:    Follow Up In Cardiology    Follow Up In Cardiology; Future    Primary hypertension    Orders:    Follow Up In Cardiology    amLODIPine (Norvasc) 2.5 mg tablet; Take 1 tablet (2.5 mg) by mouth once daily.    Follow Up In Cardiology; Future    Other hyperlipidemia    Orders:    Follow Up In Cardiology    Follow Up In Cardiology; Future    Nicotine dependence, uncomplicated, unspecified nicotine product type  Above all else, I advised the patient to quit tobacco, or else risk certain future cardiovascular morbidity, and/or mortality.\  Orders:    Follow Up In Cardiology    Follow Up In Cardiology; Future    BMI 34.0-34.9,adult    Orders:    Follow Up In Cardiology    Nonrheumatic aortic valve stenosis    Orders:    Follow Up In Cardiology; Future          Johann Friedman MD         [1]   Family History  Problem Relation Name Age of Onset    Other (CABG) Father      Other (AAA) Father

## 2025-05-09 ENCOUNTER — CLINICAL SUPPORT (OUTPATIENT)
Dept: CARDIAC REHAB | Facility: CLINIC | Age: 65
End: 2025-05-09
Payer: MEDICARE

## 2025-05-09 DIAGNOSIS — F17.200 NICOTINE DEPENDENCE, UNCOMPLICATED, UNSPECIFIED NICOTINE PRODUCT TYPE: ICD-10-CM

## 2025-05-09 DIAGNOSIS — F17.210 CIGARETTE SMOKER: ICD-10-CM

## 2025-05-09 NOTE — PROGRESS NOTES
"Tobacco Treatment Counseling Initial Visit    Name: Rito Palomo            MRN: [id number]          YOB: 1960           Age: 64 y.o.                  Today’s Date: 5/9/2025  Primary Care Physician: Topher Lou DO  Referring Physician: Johann Friedman MD  Program Location: [address]         Start time: 1:03 PM    End time: 1:58 PM      Rito Palomo presents for initial session for Tobacco Treatment Counseling. Patient currently smoking 0.5-1 PPD and has been smoking since he was age 17. Patient has a moderate dependence on nicotine according to the [employer] nicotine dependence assessment. At this time, patient is motivated to quit smoking due to health and financial concerns.  See below for detailed assessment of tobacco use and treatment plan.     Patient smokes with first cup of coffee in the morning, usually within an hour of waking. He is not craving a cigarette first thing upon waking. Generally feels okay if he is in a situation when he cannot smoke.       Smoking triggers/routines:  drinking coffee, social situations, after eating, driving (not always)     Past quit attempts:  has tried to quit in the past several times but has not surpassed >1 day without smoking... he has tried Chantix but says it did not help him at all. Also tried using a \"fake cigarette\" which did help somewhat     Potential barriers to quitting:  none identified    Strengths:  has temporarily stopped drinking alcohol, does not smoke inside the home, no one else in the home smokes     Medication plan:  discussed nicotine replacement therapy, very open to trying it.. plan to start with oral NRT such as gum or lozenges, 4 mg. Will most likely add in a patch later on.     Coping strategies:  considering cutting out coffee as this is a big trigger for him... subs/replacements (recommended Quit Go Inhaler), distraction techniques     Next steps:  start nicotine gum/lozenges 4 mg, start cutting down " on CPD, follow up two weeks to assess progress and discuss next steps in quit plan.    Follow-up scheduled 5/23/25. Patient to call office at [phone] with any questions/concerns.      Juany Dias RN

## 2025-05-23 ENCOUNTER — APPOINTMENT (OUTPATIENT)
Dept: CARDIAC REHAB | Facility: CLINIC | Age: 65
End: 2025-05-23
Payer: MEDICARE

## 2025-05-23 DIAGNOSIS — F17.210 CIGARETTE SMOKER: ICD-10-CM

## 2025-05-23 PROCEDURE — 99407 BEHAV CHNG SMOKING > 10 MIN: CPT | Performed by: INTERNAL MEDICINE

## 2025-05-23 NOTE — PROGRESS NOTES
Tobacco Treatment Counseling Follow Up Visit    Name: Rito Palomo            MRN: [id number]          YOB: 1960           Age: 64 y.o.                  Today's Date: 5/23/2025  Primary Care Physician: Topher Lou DO  Referring Physician: No ref. provider found  [place] [facility] [place] [id number]         Start time: 10:17 AM    End time: 10:31 AM     Rito Palomo presents for follow up session for Tobacco Treatment Counseling. UPDATES: Patient states that things are going well overall with cessation efforts.   He has realized he smokes the most in the morning before he goes to work, he sits outside and plays a game on his phone and he drinks his coffee. He did order the quit go inhaler, waiting for it to come. He plans to use the quit go inhaler as a replacement during this morning routine.   Still considering using nicotine replacement therapy, unsure if this is what he wants, reassured patient that NRT is quite safe with minimal adverse effects. Will continue to reevaluate need.   Discussed tracking his smoking. He is open to trying this.   Will continue to work on reducing CPD, starting with logging his cigarettes and then choosing areas of his day where he can begin to cut out cigarettes. Suggested setting specific and measurable goals for reducing.  Plan to follow up two weeks on 6/6/25.        Juany Dias RN

## 2025-06-02 DIAGNOSIS — I10 PRIMARY HYPERTENSION: ICD-10-CM

## 2025-06-02 RX ORDER — LISINOPRIL 20 MG/1
20 TABLET ORAL DAILY
Qty: 90 TABLET | Refills: 3 | OUTPATIENT
Start: 2025-06-02 | End: 2025-11-29

## 2025-06-02 NOTE — TELEPHONE ENCOUNTER
RX refill for patient. Patient states was taking Lisinopril 10 mg. Dr. Lou changed dosage to 20 mg.  Is it okay for Dr. Friedman to refill or send it to Dr. Lou to refill.

## 2025-06-06 ENCOUNTER — APPOINTMENT (OUTPATIENT)
Dept: CARDIAC REHAB | Facility: CLINIC | Age: 65
End: 2025-06-06
Payer: MEDICARE

## 2025-07-20 DIAGNOSIS — I10 PRIMARY HYPERTENSION: ICD-10-CM

## 2025-07-20 DIAGNOSIS — E78.2 MIXED HYPERLIPIDEMIA: ICD-10-CM

## 2025-07-21 RX ORDER — LISINOPRIL 20 MG/1
20 TABLET ORAL DAILY
Qty: 90 TABLET | Refills: 3 | Status: SHIPPED | OUTPATIENT
Start: 2025-07-21 | End: 2026-07-21

## 2025-07-21 RX ORDER — ROSUVASTATIN CALCIUM 20 MG/1
20 TABLET, COATED ORAL DAILY
Qty: 90 TABLET | Refills: 1 | Status: SHIPPED | OUTPATIENT
Start: 2025-07-21

## 2025-07-22 ENCOUNTER — APPOINTMENT (OUTPATIENT)
Dept: PRIMARY CARE | Facility: CLINIC | Age: 65
End: 2025-07-22
Payer: MEDICARE

## 2025-07-22 VITALS
BODY MASS INDEX: 33.99 KG/M2 | SYSTOLIC BLOOD PRESSURE: 130 MMHG | TEMPERATURE: 98.4 F | OXYGEN SATURATION: 98 % | RESPIRATION RATE: 16 BRPM | HEIGHT: 70 IN | WEIGHT: 237.4 LBS | HEART RATE: 59 BPM | DIASTOLIC BLOOD PRESSURE: 68 MMHG

## 2025-07-22 DIAGNOSIS — E66.09 EXOGENOUS OBESITY: ICD-10-CM

## 2025-07-22 DIAGNOSIS — H53.9 VISION DISTURBANCE: ICD-10-CM

## 2025-07-22 DIAGNOSIS — F17.210 CIGARETTE NICOTINE DEPENDENCE WITHOUT COMPLICATION: ICD-10-CM

## 2025-07-22 DIAGNOSIS — Z00.00 MEDICARE ANNUAL WELLNESS VISIT, SUBSEQUENT: Primary | ICD-10-CM

## 2025-07-22 DIAGNOSIS — G47.33 OBSTRUCTIVE SLEEP APNEA, ADULT: ICD-10-CM

## 2025-07-22 DIAGNOSIS — E78.2 MIXED HYPERLIPIDEMIA: ICD-10-CM

## 2025-07-22 DIAGNOSIS — R97.20 ELEVATED PSA: ICD-10-CM

## 2025-07-22 DIAGNOSIS — I10 PRIMARY HYPERTENSION: ICD-10-CM

## 2025-07-22 PROCEDURE — G0439 PPPS, SUBSEQ VISIT: HCPCS | Performed by: FAMILY MEDICINE

## 2025-07-22 PROCEDURE — 99213 OFFICE O/P EST LOW 20 MIN: CPT | Performed by: FAMILY MEDICINE

## 2025-07-22 PROCEDURE — 99497 ADVNCD CARE PLAN 30 MIN: CPT | Performed by: FAMILY MEDICINE

## 2025-07-22 ASSESSMENT — ACTIVITIES OF DAILY LIVING (ADL)
MANAGING_FINANCES: INDEPENDENT
DRESSING: INDEPENDENT
TAKING_MEDICATION: INDEPENDENT
GROCERY_SHOPPING: INDEPENDENT
BATHING: INDEPENDENT
DOING_HOUSEWORK: INDEPENDENT

## 2025-07-22 ASSESSMENT — ENCOUNTER SYMPTOMS
LOSS OF SENSATION IN FEET: 0
OCCASIONAL FEELINGS OF UNSTEADINESS: 0
DEPRESSION: 0

## 2025-07-22 ASSESSMENT — PATIENT HEALTH QUESTIONNAIRE - PHQ9
SUM OF ALL RESPONSES TO PHQ9 QUESTIONS 1 AND 2: 0
1. LITTLE INTEREST OR PLEASURE IN DOING THINGS: NOT AT ALL
2. FEELING DOWN, DEPRESSED OR HOPELESS: NOT AT ALL

## 2025-07-22 NOTE — PROGRESS NOTES
Subjective   Reason for Visit: Rito Palomo is an 64 y.o. male here for a Medicare Wellness visit.       Past Medical, Surgical, and Family History reviewed and updated in chart.    Reviewed all medications by prescribing practitioner or clinical pharmacist (such as prescriptions, OTCs, herbal therapies and supplements) and documented in the medical record.    HPI  History of Present Illness  The patient is a 64-year-old  male who comes in for a Medicare annual wellness physical. He would like supplies for his CPAP machine.    He has been using a CPAP machine for several years and is seeking new supplies as his current ones are worn out. He uses a full face mask and is comfortable with his current medical service company. He has not had any new equipment since he started using the CPAP machine. He is addicted to the CPAP machine and cannot sleep without it. He is unsure about the results of his echocardiogram done on 04/24/2025.    He has never had an eye exam in his life but does not report any significant visual issues. He uses 1.25 glasses for reading.    He has been considering quitting smoking for the past 2 years and currently smokes about half a pack a day. He has tried acupuncture and a nicotine-free product called Cool Breeze to aid in smoking cessation. He consumes approximately a 12-pack of beer per week and drinks 2 to 3 cups of coffee daily in the morning. He has been making efforts to increase his water intake. He reports no gastrointestinal issues.    His medication regimen includes amlodipine 2.5 mg once daily, baby aspirin, lisinopril, and rosuvastatin for cholesterol management. He reports no adverse effects from these medications, such as pain or cramping. He underwent an echocardiogram about 1.5 to 2 months ago but has not had any recent blood work.    Social History:  Alcohol: He consumes approximately a 12-pack of beer per week.  Tobacco: He smokes about half a pack of cigarettes  per day.  Coffee/Tea/Caffeine-containing Drinks: He drinks 2 to 3 cups of coffee daily in the morning.       Patient Care Team:  Topher Lou DO as PCP - General (Family Medicine)  Johann Friedman MD as Consulting Physician (Cardiology)       12 Systems have been reviewed as follows.  Constitutional: Fever, weight gain, weight loss, appetite change, night sweats, fatigue, chills.  Eyes : blurry, double vision, vision, loss, tearing, redness, pain, sensitivity to light, glaucoma.  Ears, nose, mouth, and throat: Hearing loss, ringing in the ears, ear pain, nasal congestion, nasal drainage, nosebleeds, mouth, throat, irritation tooth problem.  Cardiovascular :chest pain, pressure, heart racing, palpitations, sweating, leg swelling, high or low blood pressure  Pulmonary: Cough, yellow or green sputum, blood and sputum, shortness of breath, wheezing  Gastrointestinal: Nausea, vomiting, diarrhea, constipation, pain, blood in stool, or vomitus, heartburn, difficulty swallowing  Genitourinary: incontinence, abnormal bleeding, abnormal discharge, urinary frequency, urinary hesitancy, pain, impotence sexual problem, infection, urinary retention  Musculoskeletal: Pain, stiffness, joint, redness or warmth, arthritis, back pain, weakness, muscle wasting, sprain or fracture  Neuro: Weight weakness, dizziness, change in voice, change in taste change in vision, change in hearing, loss, or change of sensation, trouble walking, balance problems coordination problems, shaking, speech problem  Endocrine , cold or heat intolerance, blood sugar problem, weight gain or loss missed periods hot flashes, sweats, change in body hair, change in libido, increased thirst, increased urination  Heme/lymph: Swelling, bleeding, problem anemia, bruising, enlarged lymph nodes  Allergic/immunologic: H. plus nasal drip, watery itchy eyes, nasal drainage, immunosuppressed  The above were reviewed and noted negative except as noted in HPI and  "Problem List.    Objective   Vitals:  /68 (BP Location: Right arm, Patient Position: Sitting, BP Cuff Size: Adult long)   Pulse 59   Temp 36.9 °C (98.4 °F) (Temporal)   Resp 16   Ht 1.778 m (5' 10\")   Wt 108 kg (237 lb 6.4 oz)   SpO2 98%   BMI 34.06 kg/m²         Physical Exam  Vital Signs  Blood pressure is 130/68.     Constitutional: Well developed, well nourished, alert and in no acute distress   Eyes: Normal external exam. Pupils equally round and reactive to light with normal accommodation and extraocular movements intact.  Neck: Supple, no lymphadenopathy or masses.   Cardiovascular: Regular rate and rhythm, normal S1 and S2, no murmurs, gallops, or rubs. Radial pulses normal. No peripheral edema.  Pulmonary: No respiratory distress, lungs clear to auscultation bilaterally. No wheezes, rhonchi, rales.  Abdomen: soft,non tender, non distended, without masses or HSM  Skin: Warm, well perfused, normal skin turgor and color.   Neurologic: Cranial nerves II-XII grossly intact.   Psychiatric: Mood calm and affect normal  Musculoskeletal: Moving all extremities without restriction  The above were reviewed and noted negative except as noted in HPI and Problem List.    Problem List Items Addressed This Visit       Hyperlipidemia    Hypertension    Obstructive sleep apnea, adult    Nicotine dependence, uncomplicated    BMI 34.0-34.9,adult     Other Visit Diagnoses         Medicare annual wellness visit, subsequent    -  Primary      Vision disturbance        Relevant Orders    Referral to Ophthalmology      Elevated PSA          Exogenous obesity                 Assessment & Plan  Medicare annual wellness visit, subsequent         Vision disturbance    Orders:    Referral to Ophthalmology; Future    Mixed hyperlipidemia         Elevated PSA         Primary hypertension         Cigarette nicotine dependence without complication         Obstructive sleep apnea, adult         Exogenous obesity         BMI " 34.0-34.9,adult            Results  Imaging   - Low dose CT scan: 01/2024, Mildly calcified coronary arteries and the stent is in the mid and distal left anterior descending. Lung nodules present.    Diagnostic Testing   - Echocardiogram: 04/24/2025, Ejection fraction of 70-75% and mild aortic stenosis.       Assessment & Plan  1. Medicare annual wellness physical.  - Blood pressure readings are within the normal range at 130/68.  - Advised to maintain a healthy diet rich in fruits and vegetables, and to engage in regular physical activity.  - Fasting lab test, including PSA, has been ordered.  - Informed that he can schedule this test at his convenience.    2. Sleep apnea.  - Using a CPAP machine for several years and requires new supplies.  - No new equipment since initial setup; mask and Velcro are worn out.  - Prescription for supplies will be provided.  - Coordination with medical service company for supply order.    3. Smoking cessation.  - Smokes about half a pack a day; advised to quit smoking.  - Discussed various cessation aids such as patches, Chantix, and Wellbutrin.  - Encouraged to set a quit date and follow through with it.  - Reviewed lung screen results showing mildly calcified coronary arteries and advised annual repeat scan.    4. Hyperlipidemia.  - Currently on rosuvastatin; reports no issues with the medication.  - No pain or cramping associated with medication.  - Continue taking rosuvastatin as prescribed.  - Monitoring cholesterol levels through routine blood work.    5. Hypertension.  - On amlodipine 2.5 mg once a day and lisinopril.  - Blood pressure is well-controlled at 130/68.  - No dizziness or adverse effects reported.  - Continue current medication regimen.    6. Mild aortic stenosis.  - Echocardiogram from 04/24/2025 shows an ejection fraction of 70-75% and mild aortic stenosis.  - No significant symptoms reported.  - Repeat echocardiogram scheduled for next spring to monitor the  condition.  - Monitoring heart function and valve mobility.    7. Health maintenance.  - Lung screen in 01/2025 showed mildly calcified coronary arteries and a stent in the mid and distal left anterior descending artery.  - Advised to repeat this scan annually, next due in 01/2026.  - Received pneumonia polysaccharide vaccine in 09/2020; declined conjugate vaccine at this time.  - Information about the pneumonia vaccine will be provided for review.  - Referral to ophthalmology for an eye examination.  - Living will be provided for completion.    Follow-up: Next scheduled visit in 4 months.         Advance Directives Discussion  16 - 20 minutes were spent discussing Advanced Care Planning (including a Living Will, Medical Power Of , as well as specific end of life choices and/or directives). The details of that discussion were documented in Advanced Directives Discussion section of the medical record.

## 2025-08-19 LAB
ALBUMIN SERPL-MCNC: 4.4 G/DL (ref 3.6–5.1)
ALBUMIN SERPL-MCNC: 4.5 G/DL (ref 3.6–5.1)
ALP SERPL-CCNC: 81 U/L (ref 35–144)
ALP SERPL-CCNC: 81 U/L (ref 35–144)
ALT SERPL-CCNC: 18 U/L (ref 9–46)
ALT SERPL-CCNC: 19 U/L (ref 9–46)
ANION GAP SERPL CALCULATED.4IONS-SCNC: 8 MMOL/L (CALC) (ref 7–17)
ANION GAP SERPL CALCULATED.4IONS-SCNC: 9 MMOL/L (CALC) (ref 7–17)
AST SERPL-CCNC: 18 U/L (ref 10–35)
AST SERPL-CCNC: 19 U/L (ref 10–35)
BASOPHILS # BLD AUTO: 40 CELLS/UL (ref 0–200)
BASOPHILS NFR BLD AUTO: 0.7 %
BILIRUB SERPL-MCNC: 0.4 MG/DL (ref 0.2–1.2)
BILIRUB SERPL-MCNC: 0.4 MG/DL (ref 0.2–1.2)
BUN SERPL-MCNC: 13 MG/DL (ref 7–25)
BUN SERPL-MCNC: 13 MG/DL (ref 7–25)
CALCIUM SERPL-MCNC: 9.5 MG/DL (ref 8.6–10.3)
CALCIUM SERPL-MCNC: 9.6 MG/DL (ref 8.6–10.3)
CHLORIDE SERPL-SCNC: 103 MMOL/L (ref 98–110)
CHLORIDE SERPL-SCNC: 103 MMOL/L (ref 98–110)
CHOLEST SERPL-MCNC: 155 MG/DL
CHOLEST SERPL-MCNC: 157 MG/DL
CHOLEST/HDLC SERPL: 3.1 (CALC)
CHOLEST/HDLC SERPL: 3.1 (CALC)
CO2 SERPL-SCNC: 28 MMOL/L (ref 20–32)
CO2 SERPL-SCNC: 28 MMOL/L (ref 20–32)
CREAT SERPL-MCNC: 1.03 MG/DL (ref 0.7–1.35)
CREAT SERPL-MCNC: 1.04 MG/DL (ref 0.7–1.35)
EGFRCR SERPLBLD CKD-EPI 2021: 80 ML/MIN/1.73M2
EGFRCR SERPLBLD CKD-EPI 2021: 81 ML/MIN/1.73M2
EOSINOPHIL # BLD AUTO: 80 CELLS/UL (ref 15–500)
EOSINOPHIL NFR BLD AUTO: 1.4 %
ERYTHROCYTE [DISTWIDTH] IN BLOOD BY AUTOMATED COUNT: 13.2 % (ref 11–15)
GLUCOSE SERPL-MCNC: 114 MG/DL (ref 65–139)
GLUCOSE SERPL-MCNC: 116 MG/DL (ref 65–139)
HCT VFR BLD AUTO: 52.5 % (ref 38.5–50)
HDLC SERPL-MCNC: 50 MG/DL
HDLC SERPL-MCNC: 50 MG/DL
HGB BLD-MCNC: 17.1 G/DL (ref 13.2–17.1)
LDLC SERPL CALC-MCNC: 86 MG/DL (CALC)
LDLC SERPL CALC-MCNC: 88 MG/DL (CALC)
LYMPHOCYTES # BLD AUTO: 2633 CELLS/UL (ref 850–3900)
LYMPHOCYTES NFR BLD AUTO: 46.2 %
MCH RBC QN AUTO: 32 PG (ref 27–33)
MCHC RBC AUTO-ENTMCNC: 32.6 G/DL (ref 32–36)
MCV RBC AUTO: 98.3 FL (ref 80–100)
MONOCYTES # BLD AUTO: 399 CELLS/UL (ref 200–950)
MONOCYTES NFR BLD AUTO: 7 %
NEUTROPHILS # BLD AUTO: 2548 CELLS/UL (ref 1500–7800)
NEUTROPHILS NFR BLD AUTO: 44.7 %
NONHDLC SERPL-MCNC: 105 MG/DL (CALC)
NONHDLC SERPL-MCNC: 107 MG/DL (CALC)
PLATELET # BLD AUTO: 193 THOUSAND/UL (ref 140–400)
PMV BLD REES-ECKER: 10.1 FL (ref 7.5–12.5)
POTASSIUM SERPL-SCNC: 4.9 MMOL/L (ref 3.5–5.3)
POTASSIUM SERPL-SCNC: 5.1 MMOL/L (ref 3.5–5.3)
PROT SERPL-MCNC: 6.7 G/DL (ref 6.1–8.1)
PROT SERPL-MCNC: 6.7 G/DL (ref 6.1–8.1)
PSA SERPL-MCNC: 1.1 NG/ML
RBC # BLD AUTO: 5.34 MILLION/UL (ref 4.2–5.8)
SODIUM SERPL-SCNC: 139 MMOL/L (ref 135–146)
SODIUM SERPL-SCNC: 140 MMOL/L (ref 135–146)
TRIGL SERPL-MCNC: 91 MG/DL
TRIGL SERPL-MCNC: 91 MG/DL
WBC # BLD AUTO: 5.7 THOUSAND/UL (ref 3.8–10.8)

## 2025-09-09 ENCOUNTER — APPOINTMENT (OUTPATIENT)
Dept: OPHTHALMOLOGY | Facility: CLINIC | Age: 65
End: 2025-09-09
Payer: MEDICARE

## 2025-11-25 ENCOUNTER — APPOINTMENT (OUTPATIENT)
Dept: PRIMARY CARE | Facility: CLINIC | Age: 65
End: 2025-11-25
Payer: MEDICARE

## 2026-05-12 ENCOUNTER — APPOINTMENT (OUTPATIENT)
Dept: CARDIOLOGY | Facility: CLINIC | Age: 66
End: 2026-05-12
Payer: MEDICARE